# Patient Record
Sex: FEMALE | Race: WHITE | NOT HISPANIC OR LATINO | Employment: FULL TIME | URBAN - NONMETROPOLITAN AREA
[De-identification: names, ages, dates, MRNs, and addresses within clinical notes are randomized per-mention and may not be internally consistent; named-entity substitution may affect disease eponyms.]

---

## 2021-03-05 ENCOUNTER — IMMUNIZATION (OUTPATIENT)
Dept: VACCINE CLINIC | Facility: HOSPITAL | Age: 31
End: 2021-03-05

## 2021-03-05 PROCEDURE — 0001A: CPT | Performed by: INTERNAL MEDICINE

## 2021-03-05 PROCEDURE — 91300 HC SARSCOV02 VAC 30MCG/0.3ML IM: CPT | Performed by: INTERNAL MEDICINE

## 2021-03-26 ENCOUNTER — IMMUNIZATION (OUTPATIENT)
Dept: VACCINE CLINIC | Facility: HOSPITAL | Age: 31
End: 2021-03-26

## 2021-03-26 PROCEDURE — 91300 HC SARSCOV02 VAC 30MCG/0.3ML IM: CPT | Performed by: INTERNAL MEDICINE

## 2021-03-26 PROCEDURE — 0002A: CPT | Performed by: INTERNAL MEDICINE

## 2021-05-18 ENCOUNTER — TRANSCRIBE ORDERS (OUTPATIENT)
Dept: PHYSICAL THERAPY | Facility: CLINIC | Age: 31
End: 2021-05-18

## 2021-05-18 DIAGNOSIS — S29.019D THORACIC MYOFASCIAL STRAIN, SUBSEQUENT ENCOUNTER: ICD-10-CM

## 2021-05-18 DIAGNOSIS — S39.012D STRAIN OF LUMBAR REGION, SUBSEQUENT ENCOUNTER: Primary | ICD-10-CM

## 2022-10-19 ENCOUNTER — TRANSCRIBE ORDERS (OUTPATIENT)
Dept: ONCOLOGY | Facility: HOSPITAL | Age: 32
End: 2022-10-19

## 2022-11-16 ENCOUNTER — INFUSION (OUTPATIENT)
Dept: ONCOLOGY | Facility: HOSPITAL | Age: 32
End: 2022-11-16

## 2022-11-16 VITALS
OXYGEN SATURATION: 96 % | WEIGHT: 169 LBS | RESPIRATION RATE: 18 BRPM | SYSTOLIC BLOOD PRESSURE: 122 MMHG | HEART RATE: 89 BPM | TEMPERATURE: 98 F | DIASTOLIC BLOOD PRESSURE: 79 MMHG

## 2022-11-16 DIAGNOSIS — D50.9 IRON DEFICIENCY ANEMIA, UNSPECIFIED IRON DEFICIENCY ANEMIA TYPE: Primary | ICD-10-CM

## 2022-11-16 PROCEDURE — 25010000002 IRON SUCROSE PER 1 MG: Performed by: NURSE PRACTITIONER

## 2022-11-16 PROCEDURE — 96366 THER/PROPH/DIAG IV INF ADDON: CPT

## 2022-11-16 PROCEDURE — 96365 THER/PROPH/DIAG IV INF INIT: CPT

## 2022-11-16 RX ADMIN — IRON SUCROSE 400 MG: 20 INJECTION, SOLUTION INTRAVENOUS at 09:04

## 2022-11-23 ENCOUNTER — INFUSION (OUTPATIENT)
Dept: ONCOLOGY | Facility: HOSPITAL | Age: 32
End: 2022-11-23

## 2022-11-23 VITALS
HEART RATE: 83 BPM | DIASTOLIC BLOOD PRESSURE: 70 MMHG | SYSTOLIC BLOOD PRESSURE: 118 MMHG | RESPIRATION RATE: 18 BRPM | TEMPERATURE: 97.8 F | OXYGEN SATURATION: 96 %

## 2022-11-23 DIAGNOSIS — D50.9 IRON DEFICIENCY ANEMIA, UNSPECIFIED IRON DEFICIENCY ANEMIA TYPE: Primary | ICD-10-CM

## 2022-11-23 PROCEDURE — 25010000002 IRON SUCROSE PER 1 MG: Performed by: NURSE PRACTITIONER

## 2022-11-23 PROCEDURE — 96366 THER/PROPH/DIAG IV INF ADDON: CPT

## 2022-11-23 PROCEDURE — 96365 THER/PROPH/DIAG IV INF INIT: CPT

## 2022-11-23 RX ADMIN — IRON SUCROSE 400 MG: 20 INJECTION, SOLUTION INTRAVENOUS at 08:54

## 2022-11-30 ENCOUNTER — INFUSION (OUTPATIENT)
Dept: ONCOLOGY | Facility: HOSPITAL | Age: 32
End: 2022-11-30

## 2022-11-30 VITALS
RESPIRATION RATE: 18 BRPM | TEMPERATURE: 98.7 F | WEIGHT: 173 LBS | SYSTOLIC BLOOD PRESSURE: 114 MMHG | DIASTOLIC BLOOD PRESSURE: 73 MMHG | HEART RATE: 80 BPM | OXYGEN SATURATION: 92 %

## 2022-11-30 DIAGNOSIS — D50.9 IRON DEFICIENCY ANEMIA, UNSPECIFIED IRON DEFICIENCY ANEMIA TYPE: Primary | ICD-10-CM

## 2022-11-30 PROCEDURE — 96367 TX/PROPH/DG ADDL SEQ IV INF: CPT

## 2022-11-30 PROCEDURE — 96366 THER/PROPH/DIAG IV INF ADDON: CPT

## 2022-11-30 PROCEDURE — 96365 THER/PROPH/DIAG IV INF INIT: CPT

## 2022-11-30 PROCEDURE — 25010000002 IRON SUCROSE PER 1 MG: Performed by: NURSE PRACTITIONER

## 2022-11-30 RX ADMIN — IRON SUCROSE 400 MG: 20 INJECTION, SOLUTION INTRAVENOUS at 09:06

## 2023-01-09 ENCOUNTER — TRANSCRIBE ORDERS (OUTPATIENT)
Dept: ADMINISTRATIVE | Facility: HOSPITAL | Age: 33
End: 2023-01-09
Payer: COMMERCIAL

## 2023-01-09 DIAGNOSIS — N39.0 URINARY TRACT INFECTION WITHOUT HEMATURIA, SITE UNSPECIFIED: Primary | ICD-10-CM

## 2023-01-13 ENCOUNTER — HOSPITAL ENCOUNTER (OUTPATIENT)
Dept: ULTRASOUND IMAGING | Facility: HOSPITAL | Age: 33
Discharge: HOME | End: 2023-01-13
Admitting: NURSE PRACTITIONER
Payer: COMMERCIAL

## 2023-01-13 DIAGNOSIS — N39.0 URINARY TRACT INFECTION WITHOUT HEMATURIA, SITE UNSPECIFIED: ICD-10-CM

## 2023-01-13 PROCEDURE — 76856 US EXAM PELVIC COMPLETE: CPT

## 2023-01-13 PROCEDURE — 76856 US EXAM PELVIC COMPLETE: CPT | Performed by: RADIOLOGY

## 2023-02-03 ENCOUNTER — HOSPITAL ENCOUNTER (OUTPATIENT)
Dept: GENERAL RADIOLOGY | Facility: HOSPITAL | Age: 33
Discharge: HOME | End: 2023-02-03
Admitting: NURSE PRACTITIONER
Payer: COMMERCIAL

## 2023-02-03 ENCOUNTER — OFFICE VISIT (OUTPATIENT)
Dept: UROLOGY | Facility: CLINIC | Age: 33
End: 2023-02-03
Payer: COMMERCIAL

## 2023-02-03 VITALS — BODY MASS INDEX: 31.89 KG/M2 | HEIGHT: 63 IN | WEIGHT: 180 LBS

## 2023-02-03 DIAGNOSIS — K59.04 CHRONIC IDIOPATHIC CONSTIPATION: ICD-10-CM

## 2023-02-03 DIAGNOSIS — G89.29 CHRONIC MIDLINE LOW BACK PAIN WITHOUT SCIATICA: ICD-10-CM

## 2023-02-03 DIAGNOSIS — R35.0 FREQUENCY OF MICTURITION: ICD-10-CM

## 2023-02-03 DIAGNOSIS — N30.00 ACUTE CYSTITIS WITHOUT HEMATURIA: Primary | ICD-10-CM

## 2023-02-03 DIAGNOSIS — R33.9 INCOMPLETE EMPTYING OF BLADDER: ICD-10-CM

## 2023-02-03 DIAGNOSIS — M54.50 CHRONIC MIDLINE LOW BACK PAIN WITHOUT SCIATICA: ICD-10-CM

## 2023-02-03 DIAGNOSIS — R10.30 LOWER ABDOMINAL PAIN: ICD-10-CM

## 2023-02-03 LAB
BILIRUB BLD-MCNC: NEGATIVE MG/DL
CLARITY, POC: CLEAR
COLOR UR: YELLOW
EXPIRATION DATE: NORMAL
GLUCOSE UR STRIP-MCNC: NEGATIVE MG/DL
KETONES UR QL: NEGATIVE
LEUKOCYTE EST, POC: NEGATIVE
Lab: NORMAL
NITRITE UR-MCNC: NEGATIVE MG/ML
PH UR: 6 [PH] (ref 5–8)
PROT UR STRIP-MCNC: NEGATIVE MG/DL
RBC # UR STRIP: NEGATIVE /UL
SP GR UR: 1 (ref 1–1.03)
UROBILINOGEN UR QL: NORMAL

## 2023-02-03 PROCEDURE — 87086 URINE CULTURE/COLONY COUNT: CPT | Performed by: NURSE PRACTITIONER

## 2023-02-03 PROCEDURE — 74018 RADEX ABDOMEN 1 VIEW: CPT | Performed by: RADIOLOGY

## 2023-02-03 PROCEDURE — 99204 OFFICE O/P NEW MOD 45 MIN: CPT | Performed by: NURSE PRACTITIONER

## 2023-02-03 PROCEDURE — 74018 RADEX ABDOMEN 1 VIEW: CPT

## 2023-02-03 RX ORDER — VIBEGRON 75 MG/1
1 TABLET, FILM COATED ORAL NIGHTLY
Qty: 30 TABLET | Refills: 3 | COMMUNITY
Start: 2023-02-03 | End: 2023-03-05

## 2023-02-03 RX ORDER — PANTOPRAZOLE SODIUM 40 MG/1
1 TABLET, DELAYED RELEASE ORAL DAILY
COMMUNITY
Start: 2022-12-31

## 2023-02-03 RX ORDER — CARIPRAZINE 4.5 MG/1
1 CAPSULE, GELATIN COATED ORAL DAILY
COMMUNITY
Start: 2023-01-10

## 2023-02-03 RX ORDER — POLYETHYLENE GLYCOL 3350 17 G/17G
17 POWDER, FOR SOLUTION ORAL DAILY
Qty: 72 EACH | Refills: 5 | Status: SHIPPED | OUTPATIENT
Start: 2023-02-03

## 2023-02-03 RX ORDER — PSEUDOEPHEDRINE HCL 30 MG/1
TABLET, FILM COATED ORAL
COMMUNITY
Start: 2022-12-27 | End: 2023-03-08

## 2023-02-03 RX ORDER — GUAIFENESIN, PSEUDOEPHEDRINE HYDROCHLORIDE 600; 60 MG/1; MG/1
TABLET, EXTENDED RELEASE ORAL
COMMUNITY
Start: 2023-02-01 | End: 2023-03-08

## 2023-02-03 NOTE — PROGRESS NOTES
Chief Complaint  Urinary Tract Infection (NEW PATIENT WITH ACUTE CYSTITIS/VU)    Subjective     {CC  Problem List  Visit Diagnosis   Encounters  Notes  Medications  Labs  Result Review Imaging  Media :23}     Qing Greer presents to [facility] for VU/UTI  History of Present Illness    Mrs. Qing Greer is a pleasant 32-year-old female who presents to clinic today for evaluation as a new patient consult. She has been referred to us by her PCP CHARISSA Jensen with concerns of recurrent UTIs, stress urinary incontinence. On initial evaluation in clinic today, SHE REPORTS NUMEROUS OTHER CONCERNS. Her urine dipstick is completely negative for any infection, it is negative for gross/microscopic hematuria. Her PVR is 0 mL.     She states she has had at least 4 urinary tract infections in the last 3 months. The patient's symptoms include frequent urination, bladder incontinence, and occasional burning when she urinates. She confirms mild back pain, but notes that has been present since having her children.      The patient notes that she had to receive a blood patch after her daughter was born, from the spinal tap,  due to the severe migraines she was experiencing. She denies any suprapubic discomfort or any hematuria. The patient reports that she is unsure if the prior urinary tract infections were positive for any bacteria growth.      She does not feel that her bladder completely empties each time she urinates and notes that she has the urge to urinate very frequently. The patient was told that she did not have an urinary tract infection at the [facility], but was informed she was positive after seeing her primary care provider.     She was informed that she may have interstitial cystitis and why she was sent here for further evaluation. The patient denies being prescribed any samples of medication to help with over active bladder symptoms.  "She states she has not taken any antibiotics for a UTI in 2.5 months.     The patient confirms that she still has all her female reproductive organs. She states G2, P2. The patient denies any bladder dropping or prolapse as she states \"she is not sure what that would even fill like.\"      The patient states that she does enjoy drinking coffee, but majority of her fluid intake is water. She is not sure of the exact amount of water she drinks in a day; however, states that she \"usually always has a bottle of water in her hand.\" The patient notes that she does get up frequently during the day and at night to urinate. She reports that she really tries to not drink much near bed time, only drinks water for when she is taking her medication. The patient states that she feels like her bladder is controlling her as she mentions that she will urinate and then have the urge to go again, but nothing will come out after approximately 5 minutes of sitting on the toilet. She confirms occasional constipation and diarrhea.     She confirms to having a Pap smear in the past and nothing has come back positive. The patient has previously taken Bactrim for antibiotics. She denies any recent x-rays or scans. She states there is no history of renal calculi and is unaware if any are present currently. The patient denies any family history of bladder cancer, cervical, uterine, or breast cancer.     She currently smokes via vaping.      The patient is allergic to MACROBID.      We both reviewed/discussed her KUB results today which are unremarkable with no dilation. GASTROINTESTINAL TRACT: Abundant Colonic stool.  This is characterized by extreme amounts of constipation.  We spoke about the impact of this on bladder function.  We spoke about  its relationship to recurrent urinary tract infections, vaginal pain, pelvic/bladder pressure, back pain, flank pain she is experiencing.        Objective   Vital Signs:   Ht 160 cm (63\")   Wt 81.6 kg " (180 lb)   BMI 31.89 kg/m²       ROS:   Review of Systems   Constitutional: Negative.    HENT: Negative.    Eyes: Negative.    Respiratory: Negative.    Cardiovascular: Negative.    Gastrointestinal: Negative.    Endocrine: Negative.    Genitourinary: Positive for frequency, urgency and urinary incontinence. Negative for amenorrhea, breast discharge, breast lump, breast pain, decreased libido, decreased urine volume, difficulty urinating, dyspareunia, dysuria, flank pain, genital sores, hematuria, menstrual problem, pelvic pain, pelvic pressure, vaginal bleeding, vaginal discharge and vaginal pain.   Musculoskeletal: Negative.    Allergic/Immunologic: Negative.    Neurological: Negative.    Hematological: Negative.    Psychiatric/Behavioral: Negative.         Physical Exam   Result Review :{ Labs  Result Review  Imaging  Med Tab  Media :23}   {The following data was reviewed by (Optional):27829}  {Ambulatory Labs (Optional):32704}  {Data reviewed (Optional):23911:::1}              Assessment and Plan {CC Problem List  Visit Diagnosis  ROS  Review (Popup)  Health Maintenance  Quality  BestPractice  Medications  SmartSets  SnapShot Encounters  Media :23}   Problem List Items Addressed This Visit    None  Visit Diagnoses     Acute cystitis without hematuria    -  Primary    Relevant Medications    Vibegron (Gemtesa) 75 MG tablet    Other Relevant Orders    XR Abdomen KUB (Completed)    Incomplete emptying of bladder        Relevant Medications    Vibegron (Gemtesa) 75 MG tablet    Other Relevant Orders    Bladder Scan (Completed)    Frequency of micturition        A urodynamics study has been ordered.     Relevant Medications    Vibegron (Gemtesa) 75 MG tablet    Other Relevant Orders    Bladder Scan (Completed)    POC Urinalysis Dipstick, Automated (Completed)    Urine Culture - Urine, Urine, Random Void    Chronic midline low back pain without sciatica        Relevant Medications    polyethylene  glycol (MiraLax) 17 g packet    Other Relevant Orders    XR Abdomen KUB (Completed)    Lower abdominal pain        Relevant Medications    polyethylene glycol (MiraLax) 17 g packet    Other Relevant Orders    XR Abdomen KUB (Completed)    Chronic idiopathic constipation        Relevant Medications    polyethylene glycol (MiraLax) 17 g packet        She is getting an x-ray today. Samples of Gemtesa are sent home. I am going to reevaluate her KUB to make sure it is not severe constipation. We are going to schedule her for her for urodynamics. If the Gemtesa works great, we will cancel the urodynamics.       ASSESSMENT  Overactive Bladder/Urinary Incontinence:  Very pleasant patient evaluated in clinic today with mixed urinary incontinence symptoms that have been ongoing since her childhood, and now gradually becoming very bothersome to her.  She has failed multiple therapy in the last 5 years, none recently she was started on on oxybutynin 5 mg twice daily by her PCP, with minimal benefits.  She does not have recurrent UTIs, and denies any episodes of dysuria, burning on urination, or gross hematuria.  Her urine dipstick today is  completely negative for any infection, it is negative for gross/microscopic hematuria.  PVR is 48 CC. We discussed treatable and non-treatable causes of both stress and urge urinary incontinence.     With regards to stress urinary incontinence we discussed its relationship to childbirth and pelvic health.  We discussed the grading of stress incontinence with trying to quantitate the number of pads used.  We talked about leaking urine with laughing, lifting, coughing, and sexual intercourse.      Talked about the Urge component and the concept of mixed incontinence where upon the stress is treatable, but the urge may exist and that 50% of the time the urge will resolve with treatment of the stress incontinence.  We talked with the diagnostic workup including a postvoid residual urine, OR even  a simple cystometrogram.  I discussed the findings that may be neurologically related including commonly seen with multiple sclerosis, Parkinson's disease, and stroke.      I talked about the various therapeutic options including anticholinergics, beta 3 agonists, and alpha blockade if there is a component of obstruction.  I discussed the side effects of anti-cholinergic including dry mouth, double vision.      ASSESSMENT  Recurrent urinary tract infections/OverActiveBladder/Atrophic/yeast vaginitis: She is in no apparent distress and reports a remarkable improvement in her overall symptoms. She is happy to be feeling better she states.    She reports doing relatively better on her antibiotic prophylaxis, and would like to continue. Again,  We discussed the types of organisms that are found in the urinary tract indicating that the vast majority are results of the patient's own gastrointestinal alexandra.  We discussed how many of the antibiotics that are utilized can actually exacerbate these infections by creating resistant organisms and there is only a very few antibiotics that are concentrated in the urine and do not affect the rectal reservoir nor cause recurrent yeast vaginitis.      We discussed the risk factors for recurrent infections being intercourse in younger patients and atrophic changes in older patients.  We discussed the symptoms that are found including pain, pressure, burning, frequency, urgency suprapubic pain and painful intercourse.  I discussed upper tract symptoms including fevers, chills, and indicated the workup would be much more aggressive if the patient were to present with recurrent infections in the face of upper tract symptomatology such as fever. I discussed the history of vesicoureteral reflux in young patients and finally chronic renal scarring as a result of such.         PLAN  We resent her urine for culture. I will call her with results if any bacteria growth not sensitive to her  current therapy of Macrobid..    Will Continue Macrobid 100 mg PO Daily -Suppressive Therapy. She should increase her p.o. fluid intake to at least 1 to 2 L daily and avoid bladder irritants such as caffeine products, spicy foods, and citrusy foods.     I recommend concomitant probiotics with treatment with antibiotics to protect the rectal reservoir including over-the-counter yogurt preparations to deonna oral pills containing the appropriate probiotics. Patient reports the diligent use of Probiotics.    She is to also continue other medications for yeast vaginitis, atrophic vaginitis as prescribed above.    Will see her back in 6 months for Follow up in clinic and recheck her urinalysis at that time.    Patient is agreeable to plan of care.    .      PLAN    We sent her urine for culture, due to concerns of frequency, urgency, pelvic pain and pressure.  I will call her with results if any positive bacterial growth.    Start GEMTESSA 75MG daily.-Samples given to try X 1 month    Discussed upper tract investigation, patient had a CT scan abdomen/pelvis September 2020 which was unremarkable, besides a periumbilical hernia slightly to the right of the midline measuring approximately 5 cm in diameter.    Discussed lower tract investigation, Patient wants to wait.-Try Myrbetriq first.    Discussed things she can do to help such as her weight control, keeping a bladder diary with strict intake and output of what she eats or drinks, how often she urinates, how much she urinates.      She should follow a timed voiding bladder training program, such as limiting the amount of time between bathroom breaks, using the bathroom at regular intervals such as every 2-3 hours.  And using techniques to suppress bladder urges.      Also discussed pelvic floor muscle exercises, and do Keagle exercises to strengthen the muscles to help control urination.    We will follow-up in 4 weeks, Evaluate medication effectiveness.    Patient is  agreeable plan of care.    Patient reports that she is not currently experiencing any symptoms of urinary incontinence.      BMI is >= 30 and <35. (Class 1 Obesity). The following options were offered after discussion;: weight loss educational material (shared in after visit summary), exercise counseling/recommendations and nutrition counseling/recommendations      RADIOLOGY (CT AND/OR KUB):    CT Abdomen and Pelvis: No results found for this or any previous visit.     CT Stone Protocol: No results found for this or any previous visit.     KUB: No results found for this or any previous visit.       LABS (3 MONTHS):    Office Visit on 02/03/2023   Component Date Value Ref Range Status   • Color 02/03/2023 Yellow  Yellow, Straw, Dark Yellow, Guadalupe Final   • Clarity, UA 02/03/2023 Clear  Clear Final   • Specific Gravity  02/03/2023 1.005  1.005 - 1.030 Final   • pH, Urine 02/03/2023 6.0  5.0 - 8.0 Final   • Leukocytes 02/03/2023 Negative  Negative Final   • Nitrite, UA 02/03/2023 Negative  Negative Final   • Protein, POC 02/03/2023 Negative  Negative mg/dL Final   • Glucose, UA 02/03/2023 Negative  Negative mg/dL Final   • Ketones, UA 02/03/2023 Negative  Negative Final   • Urobilinogen, UA 02/03/2023 Normal  Normal, 0.2 E.U./dL Final   • Bilirubin 02/03/2023 Negative  Negative Final   • Blood, UA 02/03/2023 Negative  Negative Final   • Lot Number 02/03/2023 [id number]   Final   • Expiration Date 02/03/2023 2/8/24   Final        Smoking Cessation Counseling:  Current every day smoker. less than 3 minutes spent counseling. Will try to cut down.  I advised patient to quit tobacco use and offered support.  I provided patient with tobacco cessation educational material printed in the patient's After Visit Summary.       Follow Up {Instructions Charge Capture  Follow-up Communications :23}  No follow-ups on file.    Patient was given instructions and counseling regarding her condition or for health maintenance  "advice. Please see specific information pulled into the AVS if appropriate.          This document has been electronically signed by Griselda Cheng-Akwa, APRN   February 3, 2023 14:23 EST      Transcribed from ambient dictation for Griselda Cheng-Akwa, APRN by Miriam Ngo.  02/03/23   17:01 EST    {VICKY Provider Statement:20444::\"Patient or patient representative verbalized consent to the visit recording.\",\"I have personally performed the services described in this document as transcribed by the above individual, and it is both accurate and complete.\"}        Dictated Utilizing Dragon Dictation: Part of this note may be an electronic transcription/translation of spoken language to printed text using the Dragon Dictation System.       "

## 2023-02-04 LAB — BACTERIA SPEC AEROBE CULT: NO GROWTH

## 2023-02-05 NOTE — PROGRESS NOTES
Chief Complaint  Urinary Tract Infection (NEW PATIENT WITH ACUTE CYSTITIS/VU/DETRUSOR INSTABILITY/IBS-C)    Mars Greer presents to [facility] for uUTI/OAB/CYSTITIS/IBS-C  History of Present Illness  Mrs. Qing Greer is a pleasant 32-year-old female who presents to clinic today for evaluation as a new patient consult. She has been referred to us by her PCP CHARISSA Jensen with concerns of recurrent UTIs, stress urinary incontinence. On initial evaluation in clinic today, SHE REPORTS NUMEROUS OTHER CONCERNS. Her urine dipstick is completely negative for any infection, it is negative for gross/microscopic hematuria. Her PVR is 0 mL.     She states she has had at least 4 urinary tract infections in the last 3 months. The patient's symptoms include frequent urination, bladder incontinence, and occasional burning when she urinates. She confirms mild back pain, but notes that has been present since having her children.      The patient notes that she had to receive a blood patch after her daughter was born, from the spinal tap,  due to the severe migraines she was experiencing. She denies any suprapubic discomfort or any hematuria. The patient reports that she is unsure if the prior urinary tract infections were positive for any bacteria growth.      She does not feel that her bladder completely empties each time she urinates and notes that she has the urge to urinate very frequently. The patient was told that she did not have an urinary tract infection at the [facility], but was informed she was positive after seeing her primary care provider.     She was informed that she may have interstitial cystitis and why she was sent here for further evaluation. The patient denies being prescribed any samples of medication to help with over active bladder symptoms. She states she has not taken any antibiotics for a UTI in 2.5 months.     The  "patient confirms that she still has all her female reproductive organs. She states G2, P2. The patient denies any bladder dropping or prolapse as she states \"she is not sure what that would even fill like.\"      The patient states that she does enjoy drinking coffee, but majority of her fluid intake is water. She is not sure of the exact amount of water she drinks in a day; however, states that she \"usually always has a bottle of water in her hand.\" The patient notes that she does get up frequently during the day and at night to urinate. She reports that she really tries to not drink much near bed time, only drinks water for when she is taking her medication. The patient states that she feels like her bladder is controlling her as she mentions that she will urinate and then have the urge to go again, but nothing will come out after approximately 5 minutes of sitting on the toilet. She confirms occasional constipation and diarrhea.     She confirms to having a Pap smear in the past and nothing has come back positive. The patient has previously taken Bactrim for antibiotics. She denies any recent x-rays or scans. She states there is no history of renal calculi and is unaware if any are present currently. The patient denies any family history of bladder cancer, cervical, uterine, or breast cancer.     She currently smokes via vaping.      The patient is allergic to MACROBID.      We both reviewed/discussed her KUB results today which are unremarkable with no dilation. GASTROINTESTINAL TRACT: Abundant Colonic stool.  This is characterized by extreme amounts of constipation.  We spoke about the impact of this on bladder function.  We spoke about  its relationship to recurrent urinary tract infections, vaginal pain, pelvic/bladder pressure, back pain, flank pain she is experiencing.         Objective   Vital Signs:   Ht 160 cm (63\")   Wt 81.6 kg (180 lb)   BMI 31.89 kg/m²       ROS:   Review of Systems   Constitutional: " Positive for activity change, appetite change, fatigue and unexpected weight gain. Negative for chills, diaphoresis, fever and unexpected weight loss.   HENT: Negative for congestion, ear discharge, ear pain, nosebleeds, rhinorrhea, sinus pressure and sore throat.    Eyes: Negative for blurred vision, double vision, photophobia, pain, redness and visual disturbance.   Respiratory: Negative for apnea, cough, chest tightness, shortness of breath, wheezing and stridor.    Cardiovascular: Negative for chest pain and palpitations.   Gastrointestinal: Positive for abdominal distention, abdominal pain, constipation, nausea and GERD. Negative for diarrhea and vomiting.   Endocrine: Negative for polydipsia, polyphagia and polyuria.   Genitourinary: Positive for flank pain, frequency, pelvic pain and pelvic pressure. Negative for decreased urine volume, difficulty urinating, dyspareunia, dysuria, genital sores, hematuria, urgency, urinary incontinence and vaginal discharge.   Musculoskeletal: Positive for back pain and myalgias. Negative for arthralgias and joint swelling.   Skin: Positive for dry skin and pallor. Negative for rash and wound.   Neurological: Positive for weakness. Negative for dizziness, tremors, syncope, light-headedness, headache, memory problem and confusion.   Psychiatric/Behavioral: Positive for sleep disturbance and stress. Negative for behavioral problems and decreased concentration.        Physical Exam  Constitutional:       General: She is in acute distress.      Appearance: She is well-developed. She is obese.   HENT:      Head: Normocephalic and atraumatic.   Eyes:      Pupils: Pupils are equal, round, and reactive to light.   Neck:      Thyroid: No thyromegaly.      Trachea: No tracheal deviation.   Cardiovascular:      Rate and Rhythm: Normal rate and regular rhythm.      Heart sounds: No murmur heard.  Pulmonary:      Effort: Pulmonary effort is normal. No respiratory distress.      Breath  sounds: Normal breath sounds. No stridor. No wheezing.   Abdominal:      General: Bowel sounds are normal. There is distension.      Palpations: Abdomen is soft.      Tenderness: There is abdominal tenderness.   Genitourinary:     Labia:         Right: No tenderness.         Left: No tenderness.       Vagina: Normal. No vaginal discharge.      Comments: URINE FREQUENCY, URGENCY/OAB  Musculoskeletal:         General: Tenderness present. No deformity. Normal range of motion.      Cervical back: Normal range of motion.   Skin:     General: Skin is warm and dry.      Capillary Refill: Capillary refill takes less than 2 seconds.      Coloration: Skin is pale.      Findings: No erythema or rash.   Neurological:      Mental Status: She is alert and oriented to person, place, and time.      Cranial Nerves: No cranial nerve deficit.      Sensory: No sensory deficit.      Motor: Weakness present.      Coordination: Coordination normal.   Psychiatric:         Behavior: Behavior normal.         Thought Content: Thought content normal.         Judgment: Judgment normal.        Result Review :     UA    Urinalysis 2/3/23   Ketones, UA Negative   Leukocytes, UA Negative           Urine Culture    Urine Culture 2/3/23   Urine Culture No growth                Assessment and Plan    Problem List Items Addressed This Visit    None  Visit Diagnoses     Acute cystitis without hematuria    -  Primary    Relevant Medications    Vibegron (Gemtesa) 75 MG tablet    Other Relevant Orders    XR Abdomen KUB (Completed)    CT Abdomen Pelvis Stone Protocol    Incomplete emptying of bladder        Relevant Medications    Vibegron (Gemtesa) 75 MG tablet    Other Relevant Orders    Bladder Scan (Completed)    Frequency of micturition        A urodynamics study has been ordered.     Relevant Medications    Vibegron (Gemtesa) 75 MG tablet    Other Relevant Orders    Bladder Scan (Completed)    POC Urinalysis Dipstick, Automated (Completed)    Urine  Culture - Urine, Urine, Random Void (Completed)    Chronic midline low back pain without sciatica        Relevant Medications    polyethylene glycol (MiraLax) 17 g packet    Other Relevant Orders    XR Abdomen KUB (Completed)    CT Abdomen Pelvis Stone Protocol    Lower abdominal pain        Relevant Medications    polyethylene glycol (MiraLax) 17 g packet    Other Relevant Orders    XR Abdomen KUB (Completed)    CT Abdomen Pelvis Stone Protocol    Chronic idiopathic constipation        Relevant Medications    polyethylene glycol (MiraLax) 17 g packet                                                              ASSESSMENT  RECURRENT UTIS /Overactive Bladder/Urinary Incontinence/IBS-C:  Mrs. Qing Greer is a pleasant 32-year-old female who presents to clinic today for evaluation as a new patient consult. She has been referred to us by her PCP CHARISSA Jensen with concerns of recurrent UTIs, stress urinary incontinence. On initial evaluation in clinic today, SHE REPORTS NUMEROUS OTHER CONCERNS. Her urine dipstick is completely negative for any infection, it is negative for gross/microscopic hematuria. Her PVR is 0 mL      OAB/URINE INCONTINENCE:Very pleasant patient evaluated in clinic today with mixed urinary incontinence symptoms that have been ongoing  and now gradually becoming very bothersome to her. She does have recurrent UTIs, and REPORTS MANY  episodes of dysuria, burning on urination,BUT DENIES gross hematuria. We discussed treatable and non-treatable causes of both stress and urge urinary incontinence.      With regards to stress urinary incontinence we discussed its relationship to childbirth and pelvic health.  We discussed the grading of stress incontinence with trying to quantitate the number of pads used.  We talked about leaking urine with laughing, lifting, coughing, and sexual intercourse.       Talked about the Urge component and the concept of mixed incontinence where upon the stress  is treatable, but the urge may exist and that 50% of the time the urge will resolve with treatment of the stress incontinence.  We talked with the diagnostic workup including a postvoid residual urine, OR even a simple cystometrogram.  I discussed the findings that may be neurologically related including commonly seen with multiple sclerosis, Parkinson's disease, and stroke.  I talked about the various therapeutic options including anticholinergics, beta 3 agonists, and alpha blockade if there is a component of obstruction.  I discussed the side effects of anti-cholinergic including dry mouth, double vision    Recurrent urinary tract infections: We discussed the types of organisms that are found in the urinary tract indicating that the vast majority are results of the patient's own gastrointestinal alexandra.  We discussed how many of the antibiotics that are utilized can actually exacerbate these infections by creating resistant organisms and there is only a very few antibiotics that are concentrated in the urine and do not affect the rectal reservoir nor cause recurrent yeast vaginitis.       We discussed the risk factors for recurrent infections being intercourse in younger patients and atrophic changes in older patients.  We discussed the symptoms that are found including pain, pressure, burning, frequency, urgency suprapubic pain and painful intercourse.  I discussed upper tract symptoms including fevers, chills, and indicated the workup would be much more aggressive if the patient were to present with recurrent infections in the face of upper tract symptomatology such as fever. I discussed the history of vesicoureteral reflux in young patients and finally chronic renal scarring as a result of such.     IBS- Patient has significant irritable bowel syndrome, characterized by extreme amounts of constipation.  We spoke about the impact of this on bladder function.  We spoke about  its relationship to recurrent urinary  tract infections.  We discussed the need for increasing p.o. fluid intake to at least 2 to 3 L of water daily, and discussed the physiology of colonic motility as well as use of MiraLAX as a bulk laxative versus the newer class of serotonin uptake blockers such as Linzess.  We stressed the need for a daily bowel movement and discussed the [place] stool scale at length. We also discussed a referral to the GI nurse practitioner.                                                  PLAN  We resent her urine for culture. I will call her with results if any bacteria growth      Will START  Macrobid 100 mg PO Daily -Suppressive Therapy IF positive     She HAS BEEN ENCOURAGED TO increase her p.o. fluid intake to at least 1 to 2 L daily and avoid bladder irritants such as caffeine products, spicy foods, and citrusy foods.      I recommend concomitant probiotics with treatment with antibiotics to protect the rectal reservoir including over-the-counter yogurt preparations to deonna oral pills containing the appropriate probiotics. Patient reports the diligent use of Probiotics.     She is to also continue other medications for yeast vaginitis, atrophic vaginitis as prescribed above.    START GEMTESSA 75 MG DAILY FOR DETRUSSOR INSTABILITY-SAMPLES GIVEN X 1 MONTH TRIAL    ALSO DAILY MIRALAX FOR IBS-C, DISCUSSED GI REFERRAL     Will see her back in 1 months for Follow up in clinic and recheck her urinalysis at that time.ALSO EVALUATE MEDS-GEMTESSA    DISCUSS URODYNAMICS IF SYMPTOMS PERSIST-SCHEDULED 03/15/23     Discussed upper tract investigation VIA CT STONE PROTOCOL-SCHEDULED     Discussed lower tract investigation, Patient wants to wait.-Try GEMTESSA first.     Discussed things she can do to help such as her weight control, keeping a bladder diary with strict intake and output of what she eats or drinks, how often she urinates, how much she urinates.       She should follow a timed voiding bladder training program, such as  limiting the amount of time between bathroom breaks, using the bathroom at regular intervals such as every 2-3 hours.  And using techniques to suppress bladder urges.       Also discussed pelvic floor muscle exercises, and do Keagle exercises to strengthen the muscles to help control urination.     We will follow-up in 4 weeks, Evaluate medication effectiveness.     Patient is agreeable plan of care.    Patient reports that she is not currently experiencing any symptoms of urinary incontinence.    BMI is >= 30 and <35. (Class 1 Obesity). The following options were offered after discussion;: weight loss educational material (shared in after visit summary), exercise counseling/recommendations and nutrition counseling/recommendations      RADIOLOGY (CT AND/OR KUB):    CT Abdomen and Pelvis: No results found for this or any previous visit.     CT Stone Protocol: No results found for this or any previous visit.     KUB: Results for orders placed in visit on 02/03/23    XR Abdomen KUB    Narrative  EXAMINATION: XR ABDOMEN KUB-    CLINICAL INDICATION: back pain/ abdominal pain; N30.00-Acute cystitis  without hematuria; M54.50-Low back pain, unspecified; G89.29-Other  chronic pain; R10.30-Lower abdominal pain, unspecified    COMPARISON: None available    FINDINGS: 2 views of the abdomen    Moderate to large stool burden    Surgical clips in the right upper quadrant    No suspicious calcifications are seen    Tiny calcification is seen in the left hemipelvis of indeterminate  etiology.    This report was finalized on 2/3/2023 2:37 PM by Dr. Lopez Rueda MD.       LABS (3 MONTHS):    Office Visit on 02/03/2023   Component Date Value Ref Range Status   • Color 02/03/2023 Yellow  Yellow, Straw, Dark Yellow, Gu[place]upe Final   • Clarity, UA 02/03/2023 Clear  Clear Final   • Specific Gravity  02/03/2023 1.005  1.005 - 1.030 Final   • pH, Urine 02/03/2023 6.0  5.0 - 8.0 Final   • Leukocytes 02/03/2023 Negative  Negative Final   •  Nitrite, UA 02/03/2023 Negative  Negative Final   • Protein, POC 02/03/2023 Negative  Negative mg/dL Final   • Glucose, UA 02/03/2023 Negative  Negative mg/dL Final   • Ketones, UA 02/03/2023 Negative  Negative Final   • Urobilinogen, UA 02/03/2023 Normal  Normal, 0.2 E.U./dL Final   • Bilirubin 02/03/2023 Negative  Negative Final   • Blood, UA 02/03/2023 Negative  Negative Final   • Lot Number 02/03/2023 [id number]   Final   • Expiration Date 02/03/2023 2/8/24   Final   • Urine Culture 02/03/2023 No growth   Final        Smoking Cessation Counseling:  Current every day smoker. less than 3 minutes spent counseling. Will try to cut down.  I advised patient to quit tobacco use and offered support.  I provided patient with tobacco cessation educational material printed in the patient's After Visit Summary.       Follow Up   Return in about 1 month (around 3/3/2023) for Next scheduled follow up, RECURRENT UTI/DYSURIA/DETRUSSOR INSTABILITY-EVAL GEMTESSA.    Patient was given instructions and counseling regarding her condition or for health maintenance advice. Please see specific information pulled into the AVS if appropriate.          This document has been electronically signed by Griselda Cheng-Akwa, APRN   February 5, 2023 04:53 EST      Dictated Utilizing Dragon Dictation: Part of this note may be an electronic transcription/translation of spoken language to printed text using the Dragon Dictation System.

## 2023-02-06 ENCOUNTER — TELEPHONE (OUTPATIENT)
Dept: UROLOGY | Facility: CLINIC | Age: 33
End: 2023-02-06
Payer: COMMERCIAL

## 2023-02-06 NOTE — TELEPHONE ENCOUNTER
I called and spoke to the pt    ----- Message from Griselda Cheng-Akwa, APRN sent at 2/5/2023  4:27 AM EST -----  PLEASE LET PATIENT KNOW HER URINE CULTURE IS NEGATIVE AT THIS TIME. CONTINUE HER THERAPY WITH GEMTESSA AND FOLLOW UP AS DISCUSSED. RA

## 2023-02-07 DIAGNOSIS — R33.9 INCOMPLETE EMPTYING OF BLADDER: Primary | ICD-10-CM

## 2023-03-08 ENCOUNTER — OFFICE VISIT (OUTPATIENT)
Dept: UROLOGY | Facility: CLINIC | Age: 33
End: 2023-03-08
Payer: COMMERCIAL

## 2023-03-08 VITALS — WEIGHT: 182 LBS | BODY MASS INDEX: 32.25 KG/M2 | HEIGHT: 63 IN

## 2023-03-08 DIAGNOSIS — N32.81 DETRUSOR INSTABILITY OF BLADDER: ICD-10-CM

## 2023-03-08 DIAGNOSIS — R35.0 FREQUENCY OF MICTURITION: ICD-10-CM

## 2023-03-08 DIAGNOSIS — N30.00 ACUTE CYSTITIS WITHOUT HEMATURIA: Primary | ICD-10-CM

## 2023-03-08 DIAGNOSIS — N39.3 SUI (STRESS URINARY INCONTINENCE, FEMALE): ICD-10-CM

## 2023-03-08 LAB
BILIRUB BLD-MCNC: NEGATIVE MG/DL
CLARITY, POC: CLEAR
COLOR UR: YELLOW
EXPIRATION DATE: NORMAL
GLUCOSE UR STRIP-MCNC: NEGATIVE MG/DL
KETONES UR QL: NEGATIVE
LEUKOCYTE EST, POC: NEGATIVE
Lab: NORMAL
NITRITE UR-MCNC: NEGATIVE MG/ML
PH UR: 6.5 [PH] (ref 5–8)
PROT UR STRIP-MCNC: NEGATIVE MG/DL
RBC # UR STRIP: NEGATIVE /UL
SP GR UR: 1.01 (ref 1–1.03)
UROBILINOGEN UR QL: NORMAL

## 2023-03-08 PROCEDURE — 51798 US URINE CAPACITY MEASURE: CPT | Performed by: NURSE PRACTITIONER

## 2023-03-08 PROCEDURE — 1160F RVW MEDS BY RX/DR IN RCRD: CPT | Performed by: NURSE PRACTITIONER

## 2023-03-08 PROCEDURE — 87086 URINE CULTURE/COLONY COUNT: CPT | Performed by: NURSE PRACTITIONER

## 2023-03-08 PROCEDURE — 99214 OFFICE O/P EST MOD 30 MIN: CPT | Performed by: NURSE PRACTITIONER

## 2023-03-08 PROCEDURE — 1159F MED LIST DOCD IN RCRD: CPT | Performed by: NURSE PRACTITIONER

## 2023-03-08 RX ORDER — DIVALPROEX SODIUM 500 MG/1
TABLET, EXTENDED RELEASE ORAL
COMMUNITY
Start: 2023-02-10

## 2023-03-08 RX ORDER — PRAZOSIN HYDROCHLORIDE 1 MG/1
CAPSULE ORAL
COMMUNITY
Start: 2023-02-10

## 2023-03-08 NOTE — PROGRESS NOTES
"Chief Complaint  Cystitis (1 MONTH FU Urinary Tract Infection /ACUTE CYSTITIS/VU/DETRUSOR INSTABILITY/IBS-C)    Subjective          Qing Pinto presents to [facility] for VU/D//ACUTE CYSTITIS  History of Present Illness    Mrs. QING PINTO is a pleasant 32-year-old female who returns to clinic today for evaluation. This is a 1 month follow-up with prior concerns of recurrent UTIs, acute cystitis without hematuria, detrusor instability complicated by urine frequency, urgency and incontinence episodes.     When patient was initially evaluated in clinic on 02/03/2023, she was in apparent discomfort. Nevertheless that urine dipstick post her clinic evaluation and culture were all negative for infection.  The plan was to start patient on antibiotic suppressive therapy with trimethoprim 100 mg nightly which was deferred due to negative culture. Due to her significant detrusor instability, she was also started on anticholinergic with samples of Gemtesa 75 mg given for patient to try.     She returns to clinic today for Reevaluation.  She is in no apparent discomfort however, she reports she has not noticed any change in her prior symptoms since starting Gemtesa 75 mg x 1 month and reports she is still very miserable.  However when asked about urinary incontinent episodes patient reports she has 1-2 every week.  Her urine dipstick today is completely negative for any infection, it is negative for gross/microscopic hematuria. Her PVR is 0 mL.    The patient states that she is doing well. She states that her bladder is about the same. She has not noticed a difference with the Gemtesa. She states that she is allergic to Macrobid. She is still experiencing urinary incontinence 2 times a week. She wears pads and diapers. She denies any episodes of hematuria since her last visit. She denies any worsening symptoms. She relays that she has been \"cleaning out\" her bowels. She states " "that she is taking Gemtesa every morning. She states that she has a CT scheduled for 03/16/2023.     We will proceed with urodynamics study for incontinent episodes, and overactive bladder symptoms.      Pending CT scan Results at this time.    Objective   Vital Signs:   Ht 160 cm (63\")   Wt 82.6 kg (182 lb)   BMI 32.24 kg/m²       ROS:   Review of Systems   Constitutional: Positive for activity change and fatigue. Negative for appetite change, chills, diaphoresis, fever, unexpected weight gain and unexpected weight loss.   HENT: Negative for congestion, ear discharge, ear pain, nosebleeds, rhinorrhea, sinus pressure and sore throat.    Eyes: Negative for blurred vision, double vision, photophobia, pain, redness and visual disturbance.   Respiratory: Negative for apnea, cough, chest tightness, shortness of breath, wheezing and stridor.    Cardiovascular: Negative for chest pain and palpitations.   Gastrointestinal: Positive for abdominal distention, constipation and nausea. Negative for abdominal pain, diarrhea and vomiting.   Endocrine: Negative for polydipsia, polyphagia and polyuria.   Genitourinary: Positive for dysuria, flank pain, frequency, pelvic pain, pelvic pressure, urgency and urinary incontinence. Negative for decreased urine volume, difficulty urinating, dyspareunia, genital sores, hematuria and vaginal discharge.   Musculoskeletal: Positive for back pain and myalgias. Negative for arthralgias and joint swelling.   Skin: Negative for pallor, rash and wound.   Neurological: Positive for weakness. Negative for dizziness, tremors, syncope, light-headedness, headache, memory problem and confusion.   Hematological: Does not bruise/bleed easily.   Psychiatric/Behavioral: Positive for sleep disturbance and stress. Negative for behavioral problems and decreased concentration. The patient is nervous/anxious.         Physical Exam  Constitutional:       General: She is in acute distress.      Appearance: She " is well-developed. She is obese.   HENT:      Head: Normocephalic and atraumatic.   Eyes:      Pupils: Pupils are equal, round, and reactive to light.   Neck:      Thyroid: No thyromegaly.      Trachea: No tracheal deviation.   Cardiovascular:      Rate and Rhythm: Normal rate and regular rhythm.      Heart sounds: No murmur heard.  Pulmonary:      Effort: Pulmonary effort is normal. No respiratory distress.      Breath sounds: Normal breath sounds. No stridor. No wheezing.   Abdominal:      General: Bowel sounds are normal. There is distension.      Palpations: Abdomen is soft.      Tenderness: There is abdominal tenderness.   Genitourinary:     Labia:         Right: No tenderness.         Left: No tenderness.       Vagina: Normal. No vaginal discharge.      Comments: URINE INCONTINENCE/FREQUENCY/urgency  Musculoskeletal:         General: Tenderness present. No deformity. Normal range of motion.      Cervical back: Normal range of motion.   Skin:     General: Skin is warm and dry.      Capillary Refill: Capillary refill takes less than 2 seconds.      Coloration: Skin is pale.      Findings: No erythema or rash.   Neurological:      Mental Status: She is alert and oriented to person, place, and time.      Cranial Nerves: No cranial nerve deficit.      Sensory: No sensory deficit.      Coordination: Coordination normal.   Psychiatric:         Behavior: Behavior normal.         Thought Content: Thought content normal.         Judgment: Judgment normal.        Result Review :     UA    Urinalysis 2/3/23 3/8/23   Ketones, UA Negative Negative   Leukocytes, UA Negative Negative           Urine Culture    Urine Culture 2/3/23 3/8/23   Urine Culture No growth Culture in progress (P)         History of neutropenia       Assessment and Plan    Problem List Items Addressed This Visit        Genitourinary and Reproductive     Acute cystitis without hematuria - Primary    Frequency of micturition    Relevant Orders    POC  Urinalysis Dipstick, Automated (Completed)    Urine Culture - Urine, Urine, Random Void (Completed)    Detrusor instability of bladder    VU (stress urinary incontinence, female)                                                 ASSESSMENT  RECURRENT UTIS /Overactive Bladder/Urinary Incontinence/IBS-C:  Mrs. Qing Greer is a pleasant 32-year-old female who RETURNS to clinic today for evaluation . This is her one month follow with concerns of recurrent UTIs, stress urinary incontinence/detrusor instability. On initial evaluation in clinic today, SHE REPORTS NUMEROUS OTHER CONCERNS and states nothing have changed since starting anticholinergic with Gemtesa 75 mg daily.  Patient reports she still has incontinent episodes but only 1-2 times a week.  Her last urine culture was completely negative for any growth, her urine dipstick today is completely negative for any infection, it is negative for gross/microscopic hematuria. Her PVR is 0 mL      OAB/URINE INCONTINENCE:Very pleasant patient evaluated in clinic today with mixed urinary incontinence symptoms that have been ongoing  and now gradually becoming very bothersome to her. She does have a history of recurrent UTIs, and REPORTS MANY  episodes of dysuria, burning on urination,BUT DENIES gross hematuria. Again, We discussed treatable and non-treatable causes of both stress and urge urinary incontinence.      With regards to stress urinary incontinence we discussed its relationship to childbirth and pelvic health.  We discussed the grading of stress incontinence with trying to quantitate the number of pads used.  We talked about leaking urine with laughing, lifting, coughing, and sexual intercourse. Talked about the Urge component and the concept of mixed incontinence where upon the stress is treatable, but the urge may exist and that 50% of the time the urge will resolve with treatment of the stress incontinence.  We talked with the diagnostic workup including a  postvoid residual urine, OR even a simple cystometrogram.  I discussed the findings that may be neurologically related including commonly seen with multiple sclerosis, Parkinson's disease, and stroke.  I talked about the various therapeutic options including anticholinergics, beta 3 agonists, and alpha blockade if there is a component of obstruction.  I discussed the side effects of anti-cholinergic including dry mouth, double vision-PATIENT REPORTS GEMTESA DID NOT WORK FOR HER-SYMPTOMS PERSIST     Recurrent urinary tract infections: We discussed the types of organisms that are found in the urinary tract indicating that the vast majority are results of the patient's own gastrointestinal alexandra.  We discussed how many of the antibiotics that are utilized can actually exacerbate these infections by creating resistant organisms and there is only a very few antibiotics that are concentrated in the urine and do not affect the rectal reservoir nor cause recurrent yeast vaginitis.      ACUTE CYSTITIS/UTI: We discussed the risk factors for recurrent infections being intercourse in younger patients and atrophic changes in older patients.  We discussed the symptoms that are found including pain, pressure, burning, frequency, urgency suprapubic pain and painful intercourse.  I discussed upper tract symptoms including fevers, chills, and indicated the workup would be much more aggressive if the patient were to present with recurrent infections in the face of upper tract symptomatology such as fever. I discussed the history of vesicoureteral reflux in young patients and finally chronic renal scarring as a result of such. -PT HAD NEGATIVE URINE CULTURES     IBS- Patient has significant irritable bowel syndrome, characterized by extreme amounts of constipation.  We spoke about the impact of this on bladder function.  We spoke about  its relationship to recurrent urinary tract infections.  We discussed the need for increasing p.o.  fluid intake to at least 2 to 3 L of water daily, and discussed the physiology of colonic motility as well as use of MiraLAX as a bulk laxative versus the newer class of serotonin uptake blockers such as Linzess.  We stressed the need for a daily bowel movement and discussed the Ball stool scale at length. We also discussed a referral to the GI nurse practitioner.    Interstitial Cystitis- FINALLY,  We discussed the diagnosis and management of this condition.  I indicated that it was a multifactorial condition with multifactorial symptomatology, Including frequency, urgency, the sensation of urinary tract infection in the absence of a positive culture, sexual symptomatology including significant dyspareunia.  We discussed treatment options including the importance of making a clinical diagnosis and the cystoscopic findings including Hunner's ulcers etc.  We also discussed medical management including pharmacologic treatment such as amitriptyline, naturopathic treatments such as pumpkin oil and which more aggressive options of Botox injections as well as the relative risks and merits of this.  We also discussed the use of dietary manipulation including the over-the-counter product relief which basically decreases the acid in the urine and avoidance of ascitic-containing foods such as citrus is etc.                                                    PLAN  We resent her urine for culture. I will call her with results if any bacteria growth      Will START  TRIMETHORPRIM 100 mg PO Daily -Suppressive Therapy ONLY  IF Positive URINE CULTURE      She HAS BEEN ENCOURAGED TO increase her p.o. fluid intake to at least 1 to 2 L daily and avoid bladder irritants such as caffeine products, spicy foods, and citrusy foods.      I recommend concomitant probiotics with treatment with antibiotics to protect the rectal reservoir including over-the-counter yogurt preparations to deonna oral pills containing the appropriate  probiotics. Patient reports the diligent use of Probiotics.     She is to also continue other medications for yeast vaginitis vaginitis as prescribed above.     STOP GEMTESSA 75 MG DAILY FOR DETRUSSOR INSTABILITY-SAMPLES GIVEN X 1 MONTH TRIAL     ALSO CONTINUE DAILY MIRALAX FOR IBS-C, DISCUSSED GI REFERRAL     Will see her back in 1 ONE WEEK FOR URODYNAMICS EVALUATION   SYMPTOMS PERSIST-SCHEDULED 03/15/23     Discussed upper tract investigation VIA CT STONE PROTOCOL-SCHEDULED     Discussed things she can do to help such as her weight control, keeping a bladder diary with strict intake and output of what she eats or drinks, how often she urinates, how much she urinates.       She should follow a timed voiding bladder training program, such as limiting the amount of time between bathroom breaks, using the bathroom at regular intervals such as every 2-3 hours.  And using techniques to suppress bladder urges.       Also discussed pelvic floor muscle exercises, and do Keagle exercises to strengthen the muscles to help control urination.     We will follow-up POST URODYNAMICS FOR DEFINITIVE PLAN OF CARE.     Patient is agreeable plan of care.    Patient reports that she is not currently experiencing any symptoms of urinary incontinence.    BMI is >= 30 and <35. (Class 1 Obesity). The following options were offered after discussion;: weight loss educational material (shared in after visit summary), exercise counseling/recommendations and nutrition counseling/recommendations    RADIOLOGY (CT AND/OR KUB):    CT Abdomen and Pelvis: No results found for this or any previous visit.     CT Stone Protocol: No results found for this or any previous visit.     KUB: Results for orders placed in visit on 02/03/23    XR Abdomen KUB    Narrative  EXAMINATION: XR ABDOMEN KUB-    CLINICAL INDICATION: back pain/ abdominal pain; N30.00-Acute cystitis  without hematuria; M54.50-Low back pain, unspecified; G89.29-Other  chronic pain;  R10.30-Lower abdominal pain, unspecified      COMPARISON: None available    FINDINGS: 2 views of the abdomen    Moderate to large stool burden    Surgical clips in the right upper quadrant    No suspicious calcifications are seen    Tiny calcification is seen in the left hemipelvis of indeterminate  etiology.    This report was finalized on 2/3/2023 2:37 PM by Dr. Lopez Rueda MD.       LABS (3 MONTHS):    Office Visit on 03/08/2023   Component Date Value Ref Range Status   • Color 03/08/2023 Yellow  Yellow, Straw, Dark Yellow, Guadalupe Final   • Clarity, UA 03/08/2023 Clear  Clear Final   • Specific Gravity  03/08/2023 1.015  1.005 - 1.030 Final   • pH, Urine 03/08/2023 6.5  5.0 - 8.0 Final   • Leukocytes 03/08/2023 Negative  Negative Final   • Nitrite, UA 03/08/2023 Negative  Negative Final   • Protein, POC 03/08/2023 Negative  Negative mg/dL Final   • Glucose, UA 03/08/2023 Negative  Negative mg/dL Final   • Ketones, UA 03/08/2023 Negative  Negative Final   • Urobilinogen, UA 03/08/2023 Normal  Normal, 0.2 E.U./dL Final   • Bilirubin 03/08/2023 Negative  Negative Final   • Blood, UA 03/08/2023 Negative  Negative Final   • Lot Number 03/08/2023 n   Final   • Expiration Date 03/08/2023 n   Final   • Urine Culture 03/08/2023 Culture in progress   Preliminary   Office Visit on 02/03/2023   Component Date Value Ref Range Status   • Color 02/03/2023 Yellow  Yellow, Straw, Dark Yellow, Guadalupe Final   • Clarity, UA 02/03/2023 Clear  Clear Final   • Specific Gravity  02/03/2023 1.005  1.005 - 1.030 Final   • pH, Urine 02/03/2023 6.0  5.0 - 8.0 Final   • Leukocytes 02/03/2023 Negative  Negative Final   • Nitrite, UA 02/03/2023 Negative  Negative Final   • Protein, POC 02/03/2023 Negative  Negative mg/dL Final   • Glucose, UA 02/03/2023 Negative  Negative mg/dL Final   • Ketones, UA 02/03/2023 Negative  Negative Final   • Urobilinogen, UA 02/03/2023 Normal  Normal, 0.2 E.U./dL Final   • Bilirubin 02/03/2023 Negative  Negative  Final   • Blood, UA 02/03/2023 Negative  Negative Final   • Lot Number 02/03/2023 [id number]   Final   • Expiration Date 02/03/2023 2/8/24   Final   • Urine Culture 02/03/2023 No growth   Final      Smoking Cessation Counseling:  Current every day smoker. less than 3 minutes spent counseling. Not agreeable to stopping.  Patient refuses to stop tobacco use and refuses counseling.    Follow Up   Return in about 1 week (around 3/15/2023) for Next scheduled follow up, RECURRENT UTI/DYSURIA/DETRUSSOR INSTABILITY-FOR URODYNAMICS.    Patient was given instructions and counseling regarding her condition or for health maintenance advice. Please see specific information pulled into the AVS if appropriate.          This document has been electronically signed by Griselda Cheng-Akwa, APRN   March 9, 2023 13:50 EST      Dictated Utilizing Dragon Dictation: Part of this note may be an electronic transcription/translation of spoken language to printed text using the Dragon Dictation System.     Transcribed from ambient dictation for Griselda Cheng-Akwa, APRN by Korey Martinez.  03/08/23   12:52 EST    Patient or patient representative verbalized consent to the visit recording.  I have personally performed the services described in this document as transcribed by the above individual, and it is both accurate and complete.  Griselda Cheng-Akwa, APRN  3/9/2023  14:56 EST

## 2023-03-10 LAB — BACTERIA SPEC AEROBE CULT: ABNORMAL

## 2023-03-13 ENCOUNTER — TELEPHONE (OUTPATIENT)
Dept: UROLOGY | Facility: CLINIC | Age: 33
End: 2023-03-13
Payer: COMMERCIAL

## 2023-03-13 NOTE — TELEPHONE ENCOUNTER
I called the pt with results  ----- Message from Griselda Cheng-Akwa, APRN sent at 3/13/2023  9:08 AM EDT -----  Please kindly let patient know urine culture showed lactobacillus species , which are normal urogenital alexandra.  She may drop off another urine sample if she becomes symptomatic if not follow-up in clinic on 3/15 for urodynamics as discussed thank you

## 2023-03-15 ENCOUNTER — PROCEDURE VISIT (OUTPATIENT)
Dept: UROLOGY | Facility: CLINIC | Age: 33
End: 2023-03-15
Payer: COMMERCIAL

## 2023-03-15 VITALS
HEIGHT: 63 IN | WEIGHT: 182.1 LBS | DIASTOLIC BLOOD PRESSURE: 70 MMHG | BODY MASS INDEX: 32.27 KG/M2 | SYSTOLIC BLOOD PRESSURE: 112 MMHG

## 2023-03-15 DIAGNOSIS — N39.3 SUI (STRESS URINARY INCONTINENCE, FEMALE): ICD-10-CM

## 2023-03-15 DIAGNOSIS — R33.9 RETENTION OF URINE: ICD-10-CM

## 2023-03-15 PROCEDURE — 51797 INTRAABDOMINAL PRESSURE TEST: CPT | Performed by: UROLOGY

## 2023-03-15 PROCEDURE — 51741 ELECTRO-UROFLOWMETRY FIRST: CPT | Performed by: UROLOGY

## 2023-03-15 PROCEDURE — 51784 ANAL/URINARY MUSCLE STUDY: CPT | Performed by: UROLOGY

## 2023-03-15 PROCEDURE — 51728 CYSTOMETROGRAM W/VP: CPT | Performed by: UROLOGY

## 2023-03-15 NOTE — PROGRESS NOTES
PATIENT HERE FOR URODYNAMICS STUDY WITH ANGEL NGUYEN with me in attendance. SEE ATTACHMENT FOR RESULTS

## 2023-03-16 ENCOUNTER — HOSPITAL ENCOUNTER (OUTPATIENT)
Dept: CT IMAGING | Facility: HOSPITAL | Age: 33
Discharge: HOME | End: 2023-03-16
Admitting: NURSE PRACTITIONER
Payer: COMMERCIAL

## 2023-03-16 PROCEDURE — 74176 CT ABD & PELVIS W/O CONTRAST: CPT

## 2023-03-16 PROCEDURE — 74176 CT ABD & PELVIS W/O CONTRAST: CPT | Performed by: RADIOLOGY

## 2023-03-17 ENCOUNTER — TELEPHONE (OUTPATIENT)
Dept: UROLOGY | Facility: CLINIC | Age: 33
End: 2023-03-17
Payer: COMMERCIAL

## 2023-03-17 NOTE — TELEPHONE ENCOUNTER
I called the patient and let her know and she said that she making her use the bathroom    ----- Message from Griselda Cheng-Akwa, APRN sent at 3/17/2023  3:38 PM EDT -----  Please let patient know CT scan results unremarkable at this time.  No kidney stones, no mass, tell her to continue daily MiraLAX  for her severe constipation.  Follow-up in clinic as discussed thank you    IMPRESSION:  1. Moderate to large stool burden  2.Other findings as above. No obstructive uropathy or urolithiasis

## 2023-03-29 ENCOUNTER — OFFICE VISIT (OUTPATIENT)
Dept: UROLOGY | Facility: CLINIC | Age: 33
End: 2023-03-29
Payer: COMMERCIAL

## 2023-03-29 VITALS
HEART RATE: 96 BPM | DIASTOLIC BLOOD PRESSURE: 84 MMHG | WEIGHT: 182 LBS | BODY MASS INDEX: 32.25 KG/M2 | SYSTOLIC BLOOD PRESSURE: 136 MMHG

## 2023-03-29 DIAGNOSIS — N30.00 ACUTE CYSTITIS WITHOUT HEMATURIA: Primary | ICD-10-CM

## 2023-03-29 DIAGNOSIS — R33.9 INCOMPLETE BLADDER EMPTYING: ICD-10-CM

## 2023-03-29 DIAGNOSIS — R35.0 FREQUENCY OF MICTURITION: ICD-10-CM

## 2023-03-29 DIAGNOSIS — N32.81 DETRUSOR INSTABILITY OF BLADDER: ICD-10-CM

## 2023-03-29 DIAGNOSIS — N35.92 STRICTURE OF FEMALE URETHRA, UNSPECIFIED STRICTURE TYPE: ICD-10-CM

## 2023-03-29 LAB
BILIRUB BLD-MCNC: NEGATIVE MG/DL
CLARITY, POC: CLEAR
COLOR UR: YELLOW
EXPIRATION DATE: NORMAL
GLUCOSE UR STRIP-MCNC: NEGATIVE MG/DL
KETONES UR QL: NEGATIVE
LEUKOCYTE EST, POC: NEGATIVE
Lab: NORMAL
NITRITE UR-MCNC: NEGATIVE MG/ML
PH UR: 5 [PH] (ref 5–8)
PROT UR STRIP-MCNC: NEGATIVE MG/DL
RBC # UR STRIP: NEGATIVE /UL
SP GR UR: 1.01 (ref 1–1.03)
UROBILINOGEN UR QL: NORMAL

## 2023-03-29 PROCEDURE — 87086 URINE CULTURE/COLONY COUNT: CPT | Performed by: NURSE PRACTITIONER

## 2023-03-29 PROCEDURE — 1159F MED LIST DOCD IN RCRD: CPT | Performed by: NURSE PRACTITIONER

## 2023-03-29 PROCEDURE — 1160F RVW MEDS BY RX/DR IN RCRD: CPT | Performed by: NURSE PRACTITIONER

## 2023-03-29 PROCEDURE — 99214 OFFICE O/P EST MOD 30 MIN: CPT | Performed by: NURSE PRACTITIONER

## 2023-03-29 RX ORDER — TAMSULOSIN HYDROCHLORIDE 0.4 MG/1
1 CAPSULE ORAL DAILY
Qty: 30 CAPSULE | Refills: 5 | Status: SHIPPED | OUTPATIENT
Start: 2023-03-29

## 2023-03-29 NOTE — PROGRESS NOTES
Chief Complaint  Acute Cystitis  and Urinary Incontinence (Review Urodynamics )    Subjective          Qing Greer presents to [facility] for ACUTE CYSTITIS/DI/URETHRAL OBSTRUCTION    History of Present Illness     Ms. Qing Greer is a pleasant 32-year-old female who returns to clinic today for evaluation, This is a 2-week follow-up to review urodynamics. The patient was initially evaluated in clinic on 03/08/2023 after presenting with numerous symptoms, most bothersome of which was recurrent UTIs, acute cystitis without hematuria, detrusor instability complicated by urine frequency, urgency, and incontinence episodes.     Prior to that, she was first seen on 02/03 in apparent discomfort with similar symptoms. The plan was to start her on antibiotic suppressive therapy with trimethoprim which was deferred due to her negative culture. But due to significant detrusor instability, she was started on anticholinergic with Gemtesa, WITH  samples given for trial. She returned to clinic for reevaluation, reporting she had not noted any improvement in her symptoms since starting Gemtesa, HENCE SHE DID stop the medication as well.     Also her next step was to proceed with urodynamics which she completed. Overall, she states that she was doing well. She had a urodynamic evaluation done on 03/15/2023. Recommendations by Dr. Gates was a urethral stretch. So the patient will follow up with Dr. Gates for urethral stretch.      She would like to know what a urethral stretch is. She reports that she can void but does not feel she empties her bladder. The patient reports she has a low pain threshold. She denies taking Gemtesa and notes it was not helpful.    The patient affirms having constipation. She has been taking MiraLAX and over-the-counter stool softeners. She notes she had watery diarrhea which is frustrating. She denies reviewing the CT scan with me.    The patient  reports she takes prazosin at night.    She denies having a hysterectomy.    We both reviewed/discussed her KUB /CT SCAN results which are unremarkable with no dilation. GASTROINTESTINAL TRACT: Abundant COLONIC stool.  This is characterized by extreme amounts of constipation.  We spoke about the impact of this on bladder function.  We spoke about  its relationship to recurrent urinary tract infections, vaginal pain, pelvic/bladder pressure, back pain, flank pain and also urinary incontinence she is experiencing.   IMPRESSION:  1. Moderate to large stool burden  2.Other findings as above. No obstructive uropathy or urolithiasis    I also recommend daily MiraLAX to help decrease the stool burden that may be causing some bladder pressure and worsening her voiding dysfunction.    Patient is agreeable plan of care    Objective   Vital Signs:   /84   Pulse 96   Wt 82.6 kg (182 lb)   BMI 32.25 kg/m²       ROS:   Review of Systems   Constitutional: Positive for fatigue. Negative for activity change, appetite change, diaphoresis, fever, unexpected weight gain and unexpected weight loss.   HENT: Negative for congestion, ear discharge, ear pain, nosebleeds, rhinorrhea, sinus pressure and sore throat.    Eyes: Negative for blurred vision, double vision, photophobia, pain, redness and visual disturbance.   Respiratory: Negative for apnea, cough, chest tightness, shortness of breath, wheezing and stridor.    Cardiovascular: Negative for chest pain and palpitations.   Gastrointestinal: Positive for constipation. Negative for abdominal distention, abdominal pain, diarrhea, nausea and vomiting.   Endocrine: Negative for polydipsia, polyphagia and polyuria.   Genitourinary: Positive for flank pain, frequency, pelvic pressure, urgency and urinary incontinence. Negative for decreased urine volume, difficulty urinating, dysuria and hematuria.   Musculoskeletal: Positive for back pain. Negative for arthralgias and joint  swelling.   Skin: Negative for pallor, rash and wound.   Neurological: Positive for headache. Negative for dizziness, tremors, syncope, weakness, light-headedness, memory problem and confusion.   Hematological: Bruises/bleeds easily.   Psychiatric/Behavioral: Negative for behavioral problems. The patient is nervous/anxious.         Physical Exam  Constitutional:       General: She is in acute distress.      Appearance: She is well-developed. She is obese. She is ill-appearing.   HENT:      Head: Normocephalic and atraumatic.   Eyes:      Pupils: Pupils are equal, round, and reactive to light.   Neck:      Thyroid: No thyromegaly.      Trachea: No tracheal deviation.   Cardiovascular:      Rate and Rhythm: Normal rate and regular rhythm.      Heart sounds: No murmur heard.  Pulmonary:      Effort: Pulmonary effort is normal. No respiratory distress.      Breath sounds: Normal breath sounds. No stridor. No wheezing.   Abdominal:      General: Bowel sounds are normal. There is distension.      Palpations: Abdomen is soft.      Tenderness: There is no abdominal tenderness.   Genitourinary:     Labia:         Right: No tenderness.         Left: No tenderness.       Vagina: Normal. No vaginal discharge.      Comments: URETHRAL OBSTRUCTION/DI/FREQUENCY  Musculoskeletal:         General: Tenderness present. No deformity. Normal range of motion.      Cervical back: Normal range of motion.   Skin:     General: Skin is warm and dry.      Capillary Refill: Capillary refill takes less than 2 seconds.      Coloration: Skin is pale.      Findings: No erythema or rash.   Neurological:      Mental Status: She is alert and oriented to person, place, and time.      Cranial Nerves: No cranial nerve deficit.      Sensory: No sensory deficit.      Motor: Weakness present.      Coordination: Coordination normal.   Psychiatric:         Behavior: Behavior normal.         Thought Content: Thought content normal.         Judgment: Judgment  normal.        Result Review :     UA    Urinalysis 2/3/23 3/8/23 3/29/23   Ketones, UA Negative Negative Negative   Leukocytes, UA Negative Negative Negative           Urine Culture    Urine Culture 2/3/23 3/8/23 3/29/23   Urine Culture No growth 25,000 CFU/mL Lactobacillus species (A) No growth (P)   (A) Abnormal value       Comments are available for some flowsheets but are not being displayed.                Urodynamic testing on 03/15/2023:  Voiding cystometry showed during the filling phase, her bladder displayed Increased sensation and then normal bladder capacity. There was evidence of 1 involuntary bladder contraction early in the filling that caused urge urinary incontinence. The detrusor was otherwise stable. She had no stress urinary incontinence with coughing of valsalva when her bladder was filled with capacity of 250 cc. During her voiding phase, the bladder displayed a very strong detrusor contraction with no evidence of abdominal muscle recruitment to assist her in voiding. Her flow was very weak with intermittency of the rate. Her postvoid residual was greater than 258 cc and her EMG was normal, with differential diagnosis of detrusor instability secondary to some form of urinary outlet obstruction and increased sensation.    Her urine dipstick today still showed trace leukocyte esterase. It is negative for nitrite, it is negative for gross/microscopic hematuria. Last urine culture did show lactobacillus species which were normal urogenital alexandra.    CT scan of abdomen and pelvis done on 03/16/2023 was unremarkable besides moderate to large stool burden. There was no focal mass or significant bladder wall thickening. Kidneys had no mass, no obstructive uropathy. There was no evidence of urolithiasis.    Assessment and Plan    Problem List Items Addressed This Visit        Genitourinary and Reproductive     Acute cystitis without hematuria - Primary    Relevant Medications    tamsulosin (FLOMAX) 0.4  MG capsule 24 hr capsule    Other Relevant Orders    Urine Culture - Urine, Urine, Clean Catch (Completed)    Frequency of micturition    Relevant Medications    tamsulosin (FLOMAX) 0.4 MG capsule 24 hr capsule    Other Relevant Orders    POC Urinalysis Dipstick, Automated (Completed)    Detrusor instability of bladder    Relevant Medications    tamsulosin (FLOMAX) 0.4 MG capsule 24 hr capsule   Other Visit Diagnoses     Stricture of female urethra, unspecified stricture type        Relevant Medications    tamsulosin (FLOMAX) 0.4 MG capsule 24 hr capsule    Incomplete bladder emptying        Relevant Medications    tamsulosin (FLOMAX) 0.4 MG capsule 24 hr capsule        Constipation  - Continue using MiraLAX.  - Samples of given.                                                  ASSESSMENT  RECURRENT UTIS /Overactive Bladder/Urinary Incontinence/IBS-C:  Mrs. Qing Greer is a pleasant 32-year-old female who RETURNS to clinic today for evaluation . This is her one month follow with concerns of recurrent UTIs, stress urinary incontinence/detrusor instability. On initial evaluation in clinic today, SHE REPORTS NUMEROUS OTHER CONCERNS and states nothing have changed since starting anticholinergic with Gemtesa 75 mg daily-STOPPED.  Patient reports she still has incontinent episodes but only 1-2 times a week.  Her last urine culture was completely negative for any growth, her urine dipstick today is completely negative for any infection, it is negative for gross/microscopic hematuria. Her PVR is 0 mL    Discussed her urodynamic results, and need for ureteral stretch      OAB/URINE INCONTINENCE:Very pleasant patient evaluated in clinic today with mixed urinary incontinence symptoms that have been ongoing  and now gradually becoming very bothersome to her. She does have a history of recurrent UTIs, and REPORTS MANY  episodes of dysuria, burning on urination,BUT DENIES gross hematuria. Again, We discussed treatable and  non-treatable causes of both stress and urge urinary incontinence.      With regards to stress urinary incontinence we discussed its relationship to childbirth and pelvic health.  We discussed the grading of stress incontinence with trying to quantitate the number of pads used.  We talked about leaking urine with laughing, lifting, coughing, and sexual intercourse. Talked about the Urge component and the concept of mixed incontinence where upon the stress is treatable, but the urge may exist and that 50% of the time the urge will resolve with treatment of the stress incontinence.  We talked with the diagnostic workup including a postvoid residual urine, OR even a simple cystometrogram.  I discussed the findings that may be neurologically related including commonly seen with multiple sclerosis, Parkinson's disease, and stroke.  I talked about the various therapeutic options including anticholinergics, beta 3 agonists, and alpha blockade if there is a component of obstruction.  I discussed the side effects of anti-cholinergic including dry mouth, double vision-PATIENT REPORTS GEMTESA DID NOT WORK FOR HER-SYMPTOMS PERSIST     Recurrent urinary tract infections: We discussed the types of organisms that are found in the urinary tract indicating that the vast majority are results of the patient's own gastrointestinal alexandra.  We discussed how many of the antibiotics that are utilized can actually exacerbate these infections by creating resistant organisms and there is only a very few antibiotics that are concentrated in the urine and do not affect the rectal reservoir nor cause recurrent yeast vaginitis.      ACUTE CYSTITIS/UTI: We discussed the risk factors for recurrent infections being intercourse in younger patients and atrophic changes in older patients.  We discussed the symptoms that are found including pain, pressure, burning, frequency, urgency suprapubic pain and painful intercourse.  I discussed upper tract  symptoms including fevers, chills, and indicated the workup would be much more aggressive if the patient were to present with recurrent infections in the face of upper tract symptomatology such as fever. I discussed the history of vesicoureteral reflux in young patients and finally chronic renal scarring as a result of such. -PT HAD NEGATIVE URINE CULTURES     IBS- Patient has significant irritable bowel syndrome, characterized by extreme amounts of constipation.  We spoke about the impact of this on bladder function.  We spoke about  its relationship to recurrent urinary tract infections.  We discussed the need for increasing p.o. fluid intake to at least 2 to 3 L of water daily, and discussed the physiology of colonic motility as well as use of MiraLAX as a bulk laxative versus the newer class of serotonin uptake blockers such as Linzess.  We stressed the need for a daily bowel movement and discussed the Bates stool scale at length. We also discussed a referral to the GI nurse practitioner.                                                       PLAN  Patient has been scheduled WITH  Dr. Gates-evaluation for ureteral dilatation secondary to stricture per urodynamic recommendations.    We resent her urine for culture. I will call her with results if any bacteria growth      Will START  TRIMETHORPRIM 100 mg PO Daily -Suppressive Therapy ONLY  IF Positive URINE CULTURE      She HAS BEEN ENCOURAGED TO increase her p.o. fluid intake to at least 1 to 2 L daily and avoid bladder irritants such as caffeine products, spicy foods, and citrusy foods.      I recommend concomitant probiotics with treatment with antibiotics to protect the rectal reservoir including over-the-counter yogurt preparations to deonna oral pills containing the appropriate probiotics. Patient reports the diligent use of Probiotics.     She is to also continue other medications for yeast vaginitis vaginitis as prescribed above.     ALSO CONTINUE DAILY  MIRALAX FOR IBS-C, DISCUSSED GI REFERRAL     Will see her back in 1 ONE WEEK FOR URODYNAMICS EVALUATION   SYMPTOMS PERSIST-SCHEDULED 03/15/23     Discussed things she can do to help such as her weight control, keeping a bladder diary with strict intake and output of what she eats or drinks, how often she urinates, how much she urinates.       She should follow a timed voiding bladder training program, such as limiting the amount of time between bathroom breaks, using the bathroom at regular intervals such as every 2-3 hours.  And using techniques to suppress bladder urges.       Also discussed pelvic floor muscle exercises, and do Keagle exercises to strengthen the muscles to help control urination.     We will follow-up POST procedure with Dr. Gates 05/01/23    She may return sooner if symptoms worsen     Patient is agreeable plan of care.    Patient reports that she is not currently experiencing any symptoms of urinary incontinence.    BMI is >= 30 and <35. (Class 1 Obesity). The following options were offered after discussion;: weight loss educational material (shared in after visit summary), exercise counseling/recommendations and nutrition counseling/recommendations      RADIOLOGY (CT AND/OR KUB):    CT Abdomen and Pelvis: No results found for this or any previous visit.     CT Stone Protocol: Results for orders placed in visit on 02/03/23    CT Abdomen Pelvis Stone Protocol    Narrative  EXAM: CT ABDOMEN PELVIS STONE PROTOCOL-      TECHNIQUE: Multiple axial CT images were obtained from lung bases  through pubic symphysis WITHOUT administration of IV contrast.  Reformatted images in the coronal and/or sagittal plane(s) were  generated from the axial data set to facilitate diagnostic accuracy  and/or surgical planning.  Oral Contrast:NONE.    Radiation dose reduction techniques were utilized per ALARA protocol.  Automated exposure control was initiated through either or [employer] or  [employer] software packages  by  protocol.  DOSE: 893.37 mGy.cm    Clinical information Flank pain, kidney stone suspected; N30.00-Acute  cystitis without hematuria; M54.50-Low back pain, unspecified;  G89.29-Other chronic pain; R10.30-Lower abdominal pain, unspecified    Comparison none immediately available at this time    FINDINGS:    Lower thorax: Clear. No effusions.    Abdomen:    Liver: Homogeneous. No focal hepatic mass or ductal dilatation.    Gallbladder: Surgically absent    Pancreas: Unremarkable. No mass or ductal dilatation.    Spleen: Homogeneous. No splenomegaly.    Adrenals: No mass.    Kidneys/ureters: No mass. No obstructive uropathy.  No evidence of  urolithiasis.    GI tract: Moderate stool.    MESENTERY: No free fluid, walled off fluid collections, mesenteric  stranding, or enlarged lymph nodes      Vasculature: No evidence of aneurysm.    Abdominal wall: No focal hernia or mass.      Bladder: No focal mass or significant wall thickening    Reproductive: Unremarkable as visualized    Bones: No acute bony abnormality.    Impression  1. Moderate to large stool burden    2.Other findings as above. No obstructive uropathy or urolithiasis              This report was finalized on 3/17/2023 8:23 AM by Dr. Lopez Rueda MD.     KUB: Results for orders placed in visit on 02/03/23    XR Abdomen KUB    Narrative  EXAMINATION: XR ABDOMEN KUB-    CLINICAL INDICATION: back pain/ abdominal pain; N30.00-Acute cystitis  without hematuria; M54.50-Low back pain, unspecified; G89.29-Other  chronic pain; R10.30-Lower abdominal pain, unspecified      COMPARISON: None available    FINDINGS: 2 views of the abdomen    Moderate to large stool burden    Surgical clips in the right upper quadrant    No suspicious calcifications are seen    Tiny calcification is seen in the left hemipelvis of indeterminate  etiology.    This report was finalized on 2/3/2023 2:37 PM by Dr. Lopez Rueda MD.       LABS (3 MONTHS):    Office Visit on  03/29/2023   Component Date Value Ref Range Status   • Urine Culture 03/29/2023 No growth   Preliminary   • Color 03/29/2023 Yellow  Yellow, Straw, Dark Yellow, Guadalupe Final   • Clarity, UA 03/29/2023 Clear  Clear Final   • Specific Gravity  03/29/2023 1.015  1.005 - 1.030 Final   • pH, Urine 03/29/2023 5.0  5.0 - 8.0 Final   • Leukocytes 03/29/2023 Negative  Negative Final   • Nitrite, UA 03/29/2023 Negative  Negative Final   • Protein, POC 03/29/2023 Negative  Negative mg/dL Final   • Glucose, UA 03/29/2023 Negative  Negative mg/dL Final   • Ketones, UA 03/29/2023 Negative  Negative Final   • Urobilinogen, UA 03/29/2023 Normal  Normal, 0.2 E.U./dL Final   • Bilirubin 03/29/2023 Negative  Negative Final   • Blood, UA 03/29/2023 Negative  Negative Final   • Lot Number 03/29/2023 n   Final   • Expiration Date 03/29/2023 n   Final   Office Visit on 03/08/2023   Component Date Value Ref Range Status   • Color 03/08/2023 Yellow  Yellow, Straw, Dark Yellow, Guadalupe Final   • Clarity, UA 03/08/2023 Clear  Clear Final   • Specific Gravity  03/08/2023 1.015  1.005 - 1.030 Final   • pH, Urine 03/08/2023 6.5  5.0 - 8.0 Final   • Leukocytes 03/08/2023 Negative  Negative Final   • Nitrite, UA 03/08/2023 Negative  Negative Final   • Protein, POC 03/08/2023 Negative  Negative mg/dL Final   • Glucose, UA 03/08/2023 Negative  Negative mg/dL Final   • Ketones, UA 03/08/2023 Negative  Negative Final   • Urobilinogen, UA 03/08/2023 Normal  Normal, 0.2 E.U./dL Final   • Bilirubin 03/08/2023 Negative  Negative Final   • Blood, UA 03/08/2023 Negative  Negative Final   • Lot Number 03/08/2023 n   Final   • Expiration Date 03/08/2023 n   Final   • Urine Culture 03/08/2023 25,000 CFU/mL Lactobacillus species (A)   Final      Based on laboratory diagnosis criteria, these organisms are common urogenital commensal alexandra and have not been associated with urinary tract infections; clinical correlation is recommended.   Office Visit on 02/03/2023    Component Date Value Ref Range Status   • Color 02/03/2023 Yellow  Yellow, Straw, Dark Yellow, Guadalupe Final   • Clarity, UA 02/03/2023 Clear  Clear Final   • Specific Gravity  02/03/2023 1.005  1.005 - 1.030 Final   • pH, Urine 02/03/2023 6.0  5.0 - 8.0 Final   • Leukocytes 02/03/2023 Negative  Negative Final   • Nitrite, UA 02/03/2023 Negative  Negative Final   • Protein, POC 02/03/2023 Negative  Negative mg/dL Final   • Glucose, UA 02/03/2023 Negative  Negative mg/dL Final   • Ketones, UA 02/03/2023 Negative  Negative Final   • Urobilinogen, UA 02/03/2023 Normal  Normal, 0.2 E.U./dL Final   • Bilirubin 02/03/2023 Negative  Negative Final   • Blood, UA 02/03/2023 Negative  Negative Final   • Lot Number 02/03/2023 [id number]   Final   • Expiration Date 02/03/2023 2/8/24   Final   • Urine Culture 02/03/2023 No growth   Final        Smoking Cessation Counseling:  Electronic cigarette user. less than 3 minutes spent counseling. Not agreeable to stopping.  Patient refuses to stop tobacco use and refuses counseling.    Follow Up   Return in about 1 month (around 5/1/2023) for Next scheduled follow up, With Dr. Gates, Cystoscopy-URETHRAL DILATION PER URODYNAMICS-OAB/DI.    Patient was given instructions and counseling regarding her condition or for health maintenance advice. Please see specific information pulled into the AVS if appropriate.          This document has been electronically signed by Griselda Cheng-Akwa, APRN   March 30, 2023 17:41 EDT      Dictated Utilizing Dragon Dictation: Part of this note may be an electronic transcription/translation of spoken language to printed text using the Dragon Dictation System.    Transcribed from ambient dictation for Griselda Cheng-Akwa, APRN by Lizet Talbot.  03/29/23   17:39 EDT    Patient or patient representative verbalized consent to the visit recording.  I have personally performed the services described in this document as transcribed by the above individual,  and it is both accurate and complete.  Griselda Cheng-Akwa, APRN  3/30/2023  17:41 EDT

## 2023-03-30 LAB — BACTERIA SPEC AEROBE CULT: NO GROWTH

## 2023-03-31 ENCOUNTER — TELEPHONE (OUTPATIENT)
Dept: UROLOGY | Facility: CLINIC | Age: 33
End: 2023-03-31
Payer: COMMERCIAL

## 2023-03-31 NOTE — TELEPHONE ENCOUNTER
I called and spoke to the patient letting her know her urine culture was negative and to follow up with Dr. Gates. Patient verbalized understanding.                 ----- Message from Griselda Cheng-Akwa, APRN sent at 3/30/2023  1:30 PM EDT -----  Please let patient know her urine culture results are completely negative at this time.  Follow-up in clinic as discussed with Dr. Gates.  RAYSA JOSEPH

## 2023-04-06 ENCOUNTER — TELEPHONE (OUTPATIENT)
Dept: UROLOGY | Facility: CLINIC | Age: 33
End: 2023-04-06
Payer: COMMERCIAL

## 2023-04-06 DIAGNOSIS — R33.9 INCOMPLETE BLADDER EMPTYING: Primary | ICD-10-CM

## 2023-04-06 NOTE — TELEPHONE ENCOUNTER
Amador gave her samples of Trulance to try and told her if it worked she would call her some in. The patient is asking for that to be called in.    [facility] in [place]

## 2023-04-14 ENCOUNTER — TELEPHONE (OUTPATIENT)
Dept: UROLOGY | Facility: CLINIC | Age: 33
End: 2023-04-14
Payer: COMMERCIAL

## 2023-04-14 NOTE — TELEPHONE ENCOUNTER
PA for Trulance was APPROVED!        PA Case: [id number], Status: Approved, Coverage Starts on: 4/19/2023 12:00:00 AM, Coverage Ends on: 4/18/2024 12:00:00 AM.

## 2023-04-28 ENCOUNTER — TELEPHONE (OUTPATIENT)
Dept: UROLOGY | Facility: CLINIC | Age: 33
End: 2023-04-28

## 2023-05-01 ENCOUNTER — OFFICE VISIT (OUTPATIENT)
Dept: UROLOGY | Facility: CLINIC | Age: 33
End: 2023-05-01
Payer: COMMERCIAL

## 2023-05-01 VITALS
SYSTOLIC BLOOD PRESSURE: 142 MMHG | DIASTOLIC BLOOD PRESSURE: 83 MMHG | WEIGHT: 182 LBS | HEART RATE: 89 BPM | BODY MASS INDEX: 32.25 KG/M2 | HEIGHT: 63 IN

## 2023-05-01 DIAGNOSIS — N35.92 STRICTURE OF FEMALE URETHRA, UNSPECIFIED STRICTURE TYPE: Primary | ICD-10-CM

## 2023-05-01 RX ORDER — GENTAMICIN SULFATE 40 MG/ML
80 INJECTION, SOLUTION INTRAMUSCULAR; INTRAVENOUS ONCE
Status: COMPLETED | OUTPATIENT
Start: 2023-05-01 | End: 2023-05-01

## 2023-05-01 RX ADMIN — GENTAMICIN SULFATE 80 MG: 40 INJECTION, SOLUTION INTRAMUSCULAR; INTRAVENOUS at 15:29

## 2023-05-01 NOTE — PROGRESS NOTES
Chief Complaint:      Chief Complaint   Patient presents with   • Cystitis     Follow up    • Urethral Dilation        HPI:   32 y.o. female with a history of decreased force of stream needing to bend over etc. as well as an elevated residual status post urodynamics with a very slow flow rate is here for first dilation.    Past Medical History:     Past Medical History:   Diagnosis Date   • Abnormal Pap smear of cervix 2012   • STD (sexually transmitted disease) 2011    Chlamydia   • Urinary incontinence Last 3 months   • Urinary tract infection     Alot throughout my life       Current Meds:     Current Outpatient Medications   Medication Sig Dispense Refill   • divalproex (DEPAKOTE ER) 500 MG 24 hr tablet TAKE ONE TABLET BY MOUTH EVERY NIGHT AT BEDTIME FOR 30 DAYS     • pantoprazole (PROTONIX) 40 MG EC tablet Take 1 tablet by mouth Daily.     • Plecanatide 3 MG tablet Take 1 tablet by mouth As Needed (CONSTIPATION). 30 tablet 6   • polyethylene glycol (MiraLax) 17 g packet Take 17 g by mouth Daily. 72 each 5   • prazosin (MINIPRESS) 1 MG capsule TAKE ONE CAPSULE BY MOUTH EVERY NIGHT AT BEDTIME FOR 30 DAYS     • tamsulosin (FLOMAX) 0.4 MG capsule 24 hr capsule Take 1 capsule by mouth Daily. 30 capsule 5   • Vraylar 4.5 MG capsule Take 1 capsule by mouth Daily.       No current facility-administered medications for this visit.        Allergies:      Allergies   Allergen Reactions   • Macrobid [Nitrofurantoin] Itching   • Norco [Hydrocodone-Acetaminophen] Unknown - Low Severity        Past Surgical History:     Past Surgical History:   Procedure Laterality Date   • APPENDECTOMY  2018       Social History:     Social History     Socioeconomic History   • Marital status: Single   Tobacco Use   • Smoking status: Every Day     Types: Electronic Cigarette   Substance and Sexual Activity   • Alcohol use: Not Currently     Alcohol/week: 1.0 standard drink     Types: 1 Glasses of wine per week     Comment: [place]dom[place], but  Occasionally with meal   • Drug use: Never   • Sexual activity: Yes     Partners: Male       Family History:     Family History   Problem Relation Age of Onset   • Hypertension Mother    • Hypertension Maternal Grandmother        Review of Systems:     Review of Systems   Constitutional: Negative.  Negative for activity change, appetite change, chills, diaphoresis, fatigue and unexpected weight change.   HENT: Negative for congestion, dental problem, drooling, ear discharge, ear pain, facial swelling, hearing loss, mouth sores, nosebleeds, postnasal drip, rhinorrhea, sinus pressure, sneezing, sore throat, tinnitus, trouble swallowing and voice change.    Eyes: Negative.  Negative for photophobia, pain, discharge, redness, itching and visual disturbance.   Respiratory: Negative.  Negative for apnea, cough, choking, chest tightness, shortness of breath, wheezing and stridor.    Cardiovascular: Negative.  Negative for chest pain, palpitations and leg swelling.   Gastrointestinal: Negative.  Negative for abdominal distention, abdominal pain, anal bleeding, blood in stool, constipation, diarrhea, nausea, rectal pain and vomiting.   Endocrine: Negative.  Negative for cold intolerance, heat intolerance, polydipsia, polyphagia and polyuria.   Musculoskeletal: Negative.  Negative for arthralgias, back pain, gait problem, joint swelling, myalgias, neck pain and neck stiffness.   Skin: Negative.  Negative for color change, pallor, rash and wound.   Allergic/Immunologic: Negative.  Negative for environmental allergies, food allergies and immunocompromised state.   Neurological: Negative.  Negative for dizziness, tremors, seizures, syncope, facial asymmetry, speech difficulty, weakness, light-headedness, numbness and headaches.   Hematological: Negative.  Negative for adenopathy. Does not bruise/bleed easily.   Psychiatric/Behavioral: Negative for agitation, behavioral problems, confusion, decreased concentration, dysphoric  mood, hallucinations, self-injury, sleep disturbance and suicidal ideas. The patient is not nervous/anxious and is not hyperactive.    All other systems reviewed and are negative.      Physical Exam:     Physical Exam  Constitutional:       Appearance: She is well-developed.   HENT:      Head: Normocephalic and atraumatic.      Right Ear: External ear normal.      Left Ear: External ear normal.   Eyes:      Conjunctiva/sclera: Conjunctivae normal.      Pupils: Pupils are equal, round, and reactive to light.   Cardiovascular:      Rate and Rhythm: Normal rate and regular rhythm.      Heart sounds: Normal heart sounds.   Pulmonary:      Effort: Pulmonary effort is normal.      Breath sounds: Normal breath sounds.   Abdominal:      General: Bowel sounds are normal. There is no distension.      Palpations: Abdomen is soft. There is no mass.      Tenderness: There is no abdominal tenderness. There is no guarding or rebound.   Genitourinary:     General: Normal vulva.      Vagina: No vaginal discharge.   Musculoskeletal:         General: Normal range of motion.   Skin:     General: Skin is warm and dry.   Neurological:      Mental Status: She is alert.      Deep Tendon Reflexes: Reflexes are normal and symmetric.   Psychiatric:         Behavior: Behavior normal.         Thought Content: Thought content normal.         Judgment: Judgment normal.         I have reviewed the following portions of the patient's history: Allergies, current medications, past family history, past medical history, past social history, past surgical history, problem list, and ROS and confirm it is accurate.    Recent Image (CT and/or KUB):      CT Abdomen and Pelvis: No results found for this or any previous visit.       CT Stone Protocol: Results for orders placed in visit on 02/03/23    CT Abdomen Pelvis Stone Protocol    Narrative  EXAM: CT ABDOMEN PELVIS STONE PROTOCOL-      TECHNIQUE: Multiple axial CT images were obtained from lung  bases  through pubic symphysis WITHOUT administration of IV contrast.  Reformatted images in the coronal and/or sagittal plane(s) were  generated from the axial data set to facilitate diagnostic accuracy  and/or surgical planning.  Oral Contrast:NONE.    Radiation dose reduction techniques were utilized per ALARA protocol.  Automated exposure control was initiated through either or [employer] or  [employer] software packages by  protocol.  DOSE: 893.37 mGy.cm    Clinical information Flank pain, kidney stone suspected; N30.00-Acute  cystitis without hematuria; M54.50-Low back pain, unspecified;  G89.29-Other chronic pain; R10.30-Lower abdominal pain, unspecified    Comparison none immediately available at this time    FINDINGS:    Lower thorax: Clear. No effusions.    Abdomen:    Liver: Homogeneous. No focal hepatic mass or ductal dilatation.    Gallbladder: Surgically absent    Pancreas: Unremarkable. No mass or ductal dilatation.    Spleen: Homogeneous. No splenomegaly.    Adrenals: No mass.    Kidneys/ureters: No mass. No obstructive uropathy.  No evidence of  urolithiasis.    GI tract: Moderate stool.    MESENTERY: No free fluid, walled off fluid collections, mesenteric  stranding, or enlarged lymph nodes      Vasculature: No evidence of aneurysm.    Abdominal wall: No focal hernia or mass.      Bladder: No focal mass or significant wall thickening    Reproductive: Unremarkable as visualized    Bones: No acute bony abnormality.    Impression  1. Moderate to large stool burden    2.Other findings as above. No obstructive uropathy or urolithiasis              This report was finalized on 3/17/2023 8:23 AM by Dr. Lopez Rueda MD.       KUB: Results for orders placed in visit on 02/03/23    XR Abdomen KUB    Narrative  EXAMINATION: XR ABDOMEN KUB-    CLINICAL INDICATION: back pain/ abdominal pain; N30.00-Acute cystitis  without hematuria; M54.50-Low back pain, unspecified; G89.29-Other  chronic pain;  R10.30-Lower abdominal pain, unspecified      COMPARISON: None available    FINDINGS: 2 views of the abdomen    Moderate to large stool burden    Surgical clips in the right upper quadrant    No suspicious calcifications are seen    Tiny calcification is seen in the left hemipelvis of indeterminate  etiology.    This report was finalized on 2/3/2023 2:37 PM by Dr. Lopez Rueda MD.       Labs (past 3 months):      Office Visit on 03/29/2023   Component Date Value Ref Range Status   • Urine Culture 03/29/2023 No growth   Final   • Color 03/29/2023 Yellow  Yellow, Straw, Dark Yellow, Guadalupe Final   • Clarity, UA 03/29/2023 Clear  Clear Final   • Specific Gravity  03/29/2023 1.015  1.005 - 1.030 Final   • pH, Urine 03/29/2023 5.0  5.0 - 8.0 Final   • Leukocytes 03/29/2023 Negative  Negative Final   • Nitrite, UA 03/29/2023 Negative  Negative Final   • Protein, POC 03/29/2023 Negative  Negative mg/dL Final   • Glucose, UA 03/29/2023 Negative  Negative mg/dL Final   • Ketones, UA 03/29/2023 Negative  Negative Final   • Urobilinogen, UA 03/29/2023 Normal  Normal, 0.2 E.U./dL Final   • Bilirubin 03/29/2023 Negative  Negative Final   • Blood, UA 03/29/2023 Negative  Negative Final   • Lot Number 03/29/2023 n   Final   • Expiration Date 03/29/2023 n   Final   Office Visit on 03/08/2023   Component Date Value Ref Range Status   • Color 03/08/2023 Yellow  Yellow, Straw, Dark Yellow, Guadalupe Final   • Clarity, UA 03/08/2023 Clear  Clear Final   • Specific Gravity  03/08/2023 1.015  1.005 - 1.030 Final   • pH, Urine 03/08/2023 6.5  5.0 - 8.0 Final   • Leukocytes 03/08/2023 Negative  Negative Final   • Nitrite, UA 03/08/2023 Negative  Negative Final   • Protein, POC 03/08/2023 Negative  Negative mg/dL Final   • Glucose, UA 03/08/2023 Negative  Negative mg/dL Final   • Ketones, UA 03/08/2023 Negative  Negative Final   • Urobilinogen, UA 03/08/2023 Normal  Normal, 0.2 E.U./dL Final   • Bilirubin 03/08/2023 Negative  Negative Final   •  Blood, UA 03/08/2023 Negative  Negative Final   • Lot Number 03/08/2023 n   Final   • Expiration Date 03/08/2023 n   Final   • Urine Culture 03/08/2023 25,000 CFU/mL Lactobacillus species (A)   Final      Based on laboratory diagnosis criteria, these organisms are common urogenital commensal alexandra and have not been associated with urinary tract infections; clinical correlation is recommended.   Office Visit on 02/03/2023   Component Date Value Ref Range Status   • Color 02/03/2023 Yellow  Yellow, Straw, Dark Yellow, Guadalupe Final   • Clarity, UA 02/03/2023 Clear  Clear Final   • Specific Gravity  02/03/2023 1.005  1.005 - 1.030 Final   • pH, Urine 02/03/2023 6.0  5.0 - 8.0 Final   • Leukocytes 02/03/2023 Negative  Negative Final   • Nitrite, UA 02/03/2023 Negative  Negative Final   • Protein, POC 02/03/2023 Negative  Negative mg/dL Final   • Glucose, UA 02/03/2023 Negative  Negative mg/dL Final   • Ketones, UA 02/03/2023 Negative  Negative Final   • Urobilinogen, UA 02/03/2023 Normal  Normal, 0.2 E.U./dL Final   • Bilirubin 02/03/2023 Negative  Negative Final   • Blood, UA 02/03/2023 Negative  Negative Final   • Lot Number 02/03/2023 [id number]   Final   • Expiration Date 02/03/2023 2/8/24   Final   • Urine Culture 02/03/2023 No growth   Final        Procedure:     Urethral dilation-after an appropriate informed consent, the patient was brought to the procedure suite.  The urethra was gently anesthetized with 10 mL of 2% viscous Xylocaine jelly.  After an adequate period of topical anesthesia I went ahead and  the urethra was gently anesthetized and dilated with [place] sounds from 16 to 28 English sequentially without complication. The patient was given gentamicin as prophylaxis with 80 mg.  Assessment/Plan:   Posttraumatic urethral stenosis status post dilation as recommended per urodynamic protocol              This document has been electronically signed by ALICE SANTO MD May 1, 2023 15:18  EDT    Dictated Utilizing Dragon Dictation: Part of this note may be an electronic transcription/translation of spoken language to printed text using the Dragon Dictation System.

## 2023-05-02 PROBLEM — N35.92 STRICTURE OF FEMALE URETHRA: Status: ACTIVE | Noted: 2023-05-02

## 2023-05-03 ENCOUNTER — TELEPHONE (OUTPATIENT)
Dept: UROLOGY | Facility: CLINIC | Age: 33
End: 2023-05-03
Payer: COMMERCIAL

## 2023-05-22 ENCOUNTER — OFFICE VISIT (OUTPATIENT)
Dept: UROLOGY | Facility: CLINIC | Age: 33
End: 2023-05-22

## 2023-05-22 VITALS
DIASTOLIC BLOOD PRESSURE: 79 MMHG | BODY MASS INDEX: 33.38 KG/M2 | SYSTOLIC BLOOD PRESSURE: 128 MMHG | WEIGHT: 188.4 LBS | HEIGHT: 63 IN | HEART RATE: 95 BPM

## 2023-05-22 DIAGNOSIS — N99.12 POSTPROCEDURAL FEMALE URETHRAL STRICTURE: ICD-10-CM

## 2023-05-22 DIAGNOSIS — N32.81 DETRUSOR INSTABILITY OF BLADDER: Primary | ICD-10-CM

## 2023-05-22 PROCEDURE — 1160F RVW MEDS BY RX/DR IN RCRD: CPT | Performed by: UROLOGY

## 2023-05-22 PROCEDURE — 1159F MED LIST DOCD IN RCRD: CPT | Performed by: UROLOGY

## 2023-05-22 PROCEDURE — 99213 OFFICE O/P EST LOW 20 MIN: CPT | Performed by: UROLOGY

## 2023-05-22 NOTE — PROGRESS NOTES
Chief Complaint:    IC    HPI:   32 y.o. female.  Was diagnosed urodynamically with decreased flow secondary to urethral blockage and severe detrusor instability she currently gets up once to twice a night every 30 minutes during the day Gemtesa is unsuccessful bladder dilation is unsuccessful.  She is interested in a more permanent solution we discussed InterStim she would like to explore it further I will make a referral to Dr. Jr Montanez  Past Medical History:     Past Medical History:   Diagnosis Date   • Abnormal Pap smear of cervix 2012   • STD (sexually transmitted disease) 2011    Chlamydia   • Urinary incontinence Last 3 months   • Urinary tract infection     Alot throughout my life       Current Meds:     Current Outpatient Medications   Medication Sig Dispense Refill   • divalproex (DEPAKOTE ER) 500 MG 24 hr tablet TAKE ONE TABLET BY MOUTH EVERY NIGHT AT BEDTIME FOR 30 DAYS     • pantoprazole (PROTONIX) 40 MG EC tablet Take 1 tablet by mouth Daily.     • Plecanatide 3 MG tablet Take 1 tablet by mouth As Needed (CONSTIPATION). 30 tablet 6   • polyethylene glycol (MiraLax) 17 g packet Take 17 g by mouth Daily. 72 each 5   • prazosin (MINIPRESS) 1 MG capsule TAKE ONE CAPSULE BY MOUTH EVERY NIGHT AT BEDTIME FOR 30 DAYS     • tamsulosin (FLOMAX) 0.4 MG capsule 24 hr capsule Take 1 capsule by mouth Daily. 30 capsule 5   • Vraylar 4.5 MG capsule Take 1 capsule by mouth Daily.       No current facility-administered medications for this visit.        Allergies:      Allergies   Allergen Reactions   • Macrobid [Nitrofurantoin] Itching   • Norco [Hydrocodone-Acetaminophen] Unknown - Low Severity        Past Surgical History:     Past Surgical History:   Procedure Laterality Date   • APPENDECTOMY  2018       Social History:     Social History     Socioeconomic History   • Marital status: Single   Tobacco Use   • Smoking status: Every Day     Types: Electronic Cigarette   Substance and Sexual Activity   • Alcohol  use: Not Currently     Alcohol/week: 1.0 standard drink     Types: 1 Glasses of wine per week     Comment: [place]dom[place], but Occasionally with meal   • Drug use: Never   • Sexual activity: Yes     Partners: Male       Family History:     Family History   Problem Relation Age of Onset   • Hypertension Mother    • Hypertension Maternal Grandmother        Review of Systems:     Review of Systems   Constitutional: Negative.  Negative for activity change, appetite change, chills, diaphoresis, fatigue, fever and unexpected weight change.   HENT: Negative.  Negative for congestion, dental problem, drooling, ear discharge, ear pain, facial swelling, hearing loss, mouth sores, nosebleeds, postnasal drip, rhinorrhea, sinus pressure, sneezing, sore throat, tinnitus, trouble swallowing and voice change.    Eyes: Negative.  Negative for photophobia, pain, discharge, redness, itching and visual disturbance.   Respiratory: Negative.  Negative for apnea, cough, choking, chest tightness, shortness of breath, wheezing and stridor.    Cardiovascular: Negative.  Negative for chest pain, palpitations and leg swelling.   Gastrointestinal: Negative.  Negative for abdominal distention, abdominal pain, anal bleeding, blood in stool, constipation, diarrhea, nausea, rectal pain and vomiting.   Endocrine: Negative.  Negative for cold intolerance, heat intolerance, polydipsia, polyphagia and polyuria.   Genitourinary: Positive for frequency and urgency.   Musculoskeletal: Negative.  Negative for arthralgias, back pain, gait problem, joint swelling, myalgias, neck pain and neck stiffness.   Skin: Negative.  Negative for color change, pallor, rash and wound.   Allergic/Immunologic: Negative.  Negative for environmental allergies, food allergies and immunocompromised state.   Neurological: Negative.  Negative for dizziness, tremors, seizures, syncope, facial asymmetry, speech difficulty, weakness, light-headedness, numbness and headaches.    Hematological: Negative.  Negative for adenopathy. Does not bruise/bleed easily.   Psychiatric/Behavioral: Negative.  Negative for agitation, behavioral problems, confusion, decreased concentration, dysphoric mood, hallucinations, self-injury, sleep disturbance and suicidal ideas. The patient is not nervous/anxious and is not hyperactive.    All other systems reviewed and are negative.      Physical Exam:     Physical Exam  Constitutional:       Appearance: She is well-developed.   HENT:      Head: Normocephalic and atraumatic.      Right Ear: External ear normal.      Left Ear: External ear normal.   Eyes:      Conjunctiva/sclera: Conjunctivae normal.      Pupils: Pupils are equal, round, and reactive to light.   Cardiovascular:      Rate and Rhythm: Normal rate and regular rhythm.      Heart sounds: Normal heart sounds.   Pulmonary:      Effort: Pulmonary effort is normal.      Breath sounds: Normal breath sounds.   Abdominal:      General: Bowel sounds are normal. There is no distension.      Palpations: Abdomen is soft. There is no mass.      Tenderness: There is no abdominal tenderness. There is no guarding or rebound.   Genitourinary:     Vagina: No vaginal discharge.   Musculoskeletal:         General: Normal range of motion.   Skin:     General: Skin is warm and dry.   Neurological:      Mental Status: She is alert.      Deep Tendon Reflexes: Reflexes are normal and symmetric.   Psychiatric:         Behavior: Behavior normal.         Thought Content: Thought content normal.         Judgment: Judgment normal.         I have reviewed the following portions of the patient's history: Allergies, current medications, past family history, past medical history, past social history, past surgical history, problem list, and ROS and confirm it is accurate.    Recent Image (CT and/or KUB):      CT Abdomen and Pelvis: No results found for this or any previous visit.       CT Stone Protocol: Results for orders placed in  visit on 02/03/23    CT Abdomen Pelvis Stone Protocol    Narrative  EXAM: CT ABDOMEN PELVIS STONE PROTOCOL-      TECHNIQUE: Multiple axial CT images were obtained from lung bases  through pubic symphysis WITHOUT administration of IV contrast.  Reformatted images in the coronal and/or sagittal plane(s) were  generated from the axial data set to facilitate diagnostic accuracy  and/or surgical planning.  Oral Contrast:NONE.    Radiation dose reduction techniques were utilized per ALARA protocol.  Automated exposure control was initiated through either or [employer] or  [employer] software packages by  protocol.  DOSE: 893.37 mGy.cm    Clinical information Flank pain, kidney stone suspected; N30.00-Acute  cystitis without hematuria; M54.50-Low back pain, unspecified;  G89.29-Other chronic pain; R10.30-Lower abdominal pain, unspecified    Comparison none immediately available at this time    FINDINGS:    Lower thorax: Clear. No effusions.    Abdomen:    Liver: Homogeneous. No focal hepatic mass or ductal dilatation.    Gallbladder: Surgically absent    Pancreas: Unremarkable. No mass or ductal dilatation.    Spleen: Homogeneous. No splenomegaly.    Adrenals: No mass.    Kidneys/ureters: No mass. No obstructive uropathy.  No evidence of  urolithiasis.    GI tract: Moderate stool.    MESENTERY: No free fluid, walled off fluid collections, mesenteric  stranding, or enlarged lymph nodes      Vasculature: No evidence of aneurysm.    Abdominal wall: No focal hernia or mass.      Bladder: No focal mass or significant wall thickening    Reproductive: Unremarkable as visualized    Bones: No acute bony abnormality.    Impression  1. Moderate to large stool burden    2.Other findings as above. No obstructive uropathy or urolithiasis              This report was finalized on 3/17/2023 8:23 AM by Dr. Lopez Rueda MD.       KUB: Results for orders placed in visit on 02/03/23    XR Abdomen KUB    Narrative  EXAMINATION: XR  ABDOMEN KUB-    CLINICAL INDICATION: back pain/ abdominal pain; N30.00-Acute cystitis  without hematuria; M54.50-Low back pain, unspecified; G89.29-Other  chronic pain; R10.30-Lower abdominal pain, unspecified      COMPARISON: None available    FINDINGS: 2 views of the abdomen    Moderate to large stool burden    Surgical clips in the right upper quadrant    No suspicious calcifications are seen    Tiny calcification is seen in the left hemipelvis of indeterminate  etiology.    This report was finalized on 2/3/2023 2:37 PM by Dr. Lopez Rueda MD.       Labs (past 3 months):      Office Visit on 03/29/2023   Component Date Value Ref Range Status   • Urine Culture 03/29/2023 No growth   Final   • Color 03/29/2023 Yellow  Yellow, Straw, Dark Yellow, Guadalupe Final   • Clarity, UA 03/29/2023 Clear  Clear Final   • Specific Gravity  03/29/2023 1.015  1.005 - 1.030 Final   • pH, Urine 03/29/2023 5.0  5.0 - 8.0 Final   • Leukocytes 03/29/2023 Negative  Negative Final   • Nitrite, UA 03/29/2023 Negative  Negative Final   • Protein, POC 03/29/2023 Negative  Negative mg/dL Final   • Glucose, UA 03/29/2023 Negative  Negative mg/dL Final   • Ketones, UA 03/29/2023 Negative  Negative Final   • Urobilinogen, UA 03/29/2023 Normal  Normal, 0.2 E.U./dL Final   • Bilirubin 03/29/2023 Negative  Negative Final   • Blood, UA 03/29/2023 Negative  Negative Final   • Lot Number 03/29/2023 n   Final   • Expiration Date 03/29/2023 n   Final   Office Visit on 03/08/2023   Component Date Value Ref Range Status   • Color 03/08/2023 Yellow  Yellow, Straw, Dark Yellow, Guadalupe Final   • Clarity, UA 03/08/2023 Clear  Clear Final   • Specific Gravity  03/08/2023 1.015  1.005 - 1.030 Final   • pH, Urine 03/08/2023 6.5  5.0 - 8.0 Final   • Leukocytes 03/08/2023 Negative  Negative Final   • Nitrite, UA 03/08/2023 Negative  Negative Final   • Protein, POC 03/08/2023 Negative  Negative mg/dL Final   • Glucose, UA 03/08/2023 Negative  Negative mg/dL Final    • Ketones, UA 03/08/2023 Negative  Negative Final   • Urobilinogen, UA 03/08/2023 Normal  Normal, 0.2 E.U./dL Final   • Bilirubin 03/08/2023 Negative  Negative Final   • Blood, UA 03/08/2023 Negative  Negative Final   • Lot Number 03/08/2023 n   Final   • Expiration Date 03/08/2023 n   Final   • Urine Culture 03/08/2023 25,000 CFU/mL Lactobacillus species (A)   Final      Based on laboratory diagnosis criteria, these organisms are common urogenital commensal alexandra and have not been associated with urinary tract infections; clinical correlation is recommended.        Procedure:       Assessment/Plan:   Detrusor instability-patient has been diagnosed with detrusor instability which is an irritative bladder symptomatology most likely related to factors such as intake of bladder irritants, postinfectious irritation, prolapse, with a very large differential diagnosis.  The mainstay of treatment has been tight cholinergics which basically cause the bladder to have decreased contractility.  We have discussed the side effects of these treatments including dry mouth, double vision, and increasing constipation.  She has failed oral anticholinergics.  She still has significant voiding dysfunction and the dilation did not help at all.  She gets up every 30 minutes during the day she is interested in a permanent solution we discussed InterStim we will make referral for discussion and a consultation.  She has had a complete urodynamic investigation        This document has been electronically signed by ALICE SNATO MD May 22, 2023 15:16 EDT    Dictated Utilizing Dragon Dictation: Part of this note may be an electronic transcription/translation of spoken language to printed text using the Dragon Dictation System.

## 2023-06-01 DIAGNOSIS — N32.81 DETRUSOR INSTABILITY OF BLADDER: Primary | ICD-10-CM

## 2023-07-14 ENCOUNTER — TELEPHONE (OUTPATIENT)
Dept: UROLOGY | Facility: CLINIC | Age: 33
End: 2023-07-14

## 2023-07-14 NOTE — TELEPHONE ENCOUNTER
Called patient and answered her questions about MyChart message she is aware she has cystoscopy scheduled with Dr. Montanez.

## 2023-07-14 NOTE — TELEPHONE ENCOUNTER
Caller: ELMER PINTO    Relationship: SELF    Best call back number: [phone]     PT RETURNING CALL TO ANDREA. I ADVISED PT ANDREA LEFT HER A MYCHART MESSAGE AS WELL.

## 2023-08-08 RX ORDER — PHENTERMINE HYDROCHLORIDE 37.5 MG/1
TABLET ORAL
COMMUNITY
Start: 2023-04-13

## 2023-08-08 NOTE — PRE-PROCEDURE INSTRUCTIONS
PAT phone history completed with pt for upcoming procedure on 8/9/23.  Pt instructed does not need to be NPO since this is a case done under local anesthesia.

## 2023-08-09 ENCOUNTER — ANESTHESIA EVENT (OUTPATIENT)
Dept: PREOP | Facility: HOSPITAL | Age: 33
End: 2023-08-09

## 2023-08-09 ENCOUNTER — HOSPITAL ENCOUNTER (OUTPATIENT)
Dept: PREOP | Facility: HOSPITAL | Age: 33
Discharge: HOME | End: 2023-08-09
Payer: COMMERCIAL

## 2023-08-09 ENCOUNTER — ANESTHESIA (OUTPATIENT)
Dept: PREOP | Facility: HOSPITAL | Age: 33
End: 2023-08-09

## 2023-08-09 VITALS
OXYGEN SATURATION: 97 % | RESPIRATION RATE: 18 BRPM | TEMPERATURE: 97.7 F | HEART RATE: 80 BPM | SYSTOLIC BLOOD PRESSURE: 128 MMHG | DIASTOLIC BLOOD PRESSURE: 78 MMHG

## 2023-08-09 DIAGNOSIS — N32.81 OAB (OVERACTIVE BLADDER): Primary | ICD-10-CM

## 2023-08-09 LAB
B-HCG UR QL: NEGATIVE
EXPIRATION DATE: NORMAL
INTERNAL NEGATIVE CONTROL: NORMAL
INTERNAL POSITIVE CONTROL: NORMAL
Lab: NORMAL

## 2023-08-09 PROCEDURE — C1897 LEAD, NEUROSTIM TEST KIT: HCPCS

## 2023-08-09 PROCEDURE — 81025 URINE PREGNANCY TEST: CPT | Performed by: UROLOGY

## 2023-08-09 PROCEDURE — C1787 PATIENT PROGR, NEUROSTIM: HCPCS

## 2023-08-09 RX ORDER — LIDOCAINE HYDROCHLORIDE AND EPINEPHRINE 10; 10 MG/ML; UG/ML
40 INJECTION, SOLUTION INFILTRATION; PERINEURAL ONCE
Status: COMPLETED | OUTPATIENT
Start: 2023-08-09 | End: 2023-08-09

## 2023-08-09 RX ORDER — SULFAMETHOXAZOLE AND TRIMETHOPRIM 800; 160 MG/1; MG/1
1 TABLET ORAL ONCE
Status: COMPLETED | OUTPATIENT
Start: 2023-08-09 | End: 2023-08-09

## 2023-08-09 RX ADMIN — SULFAMETHOXAZOLE AND TRIMETHOPRIM 1 TABLET: 800; 160 TABLET ORAL at 12:50

## 2023-08-09 RX ADMIN — LIDOCAINE HYDROCHLORIDE AND EPINEPHRINE 40 ML: 10; 10 INJECTION, SOLUTION INFILTRATION; PERINEURAL at 15:01

## 2023-08-09 NOTE — PROCEDURES
"Procedure Note     SURGEON: Jr Montanez MD    PREOPERATIVE DIAGNOSIS: Refractory Urge Urinary Incontinence     POSTOPERATIVE DIAGNOSIS: Same     PROCEDURE PERFORMED:   1. Basic Evaluation (PNE) without fluoroscopy using the enhanced VerifyT System - (CPT Code: 23025-98), bilateral lead placement    ANESTHESIA: Lidocaine 2%     BLOOD LOSS: Minimal.     SPECIMEN: None.     COMPLICATION: None.     INDICATION FOR PROCEDURE: Chris Greer is a 33 y.o. y.o.-year-old with Refractory Overactive bladder / UUI.  After discussion a decision was made to proceed with a trial of InterStim placement with basic evaluation.     DESCRIPTION OF THE PROCEDURE:   The Patient was properly identified and placed in prone position. Pillows were placed under lower abdomen to flatten sacrum and under shins to allow the toes to dangle freely. A ground pad was placed on the bottom of the patient's foot and the proximal ends of the test stimulation cable were connected to the ground pad and the external neurostimulator (ENS). Patient was prepped and draped in usual sterile fashion.   The sciatic notches and sacral midline were identified via palpation. The level of S3 was identified by measuring 9cm from the tip of the coccyx. Local injection of 2% Lidocaine was administered at foramen needle entry point located 2cm lateral to the sacral midline and 2cm cephalad of sciatic notch level. A foramen needle was introduced at an approximate 60 degree angle, feeling for the foraminal margins until S3 was identified. Proper needle position was confirmed by the patient identifying location of stimulation sensation; and direct observation of the lifting of the perineum or "bellowing," and plantar flexion of the great toe utilizing the test stimulation cable, ENS and VerifyT .     The foramen needle stylet was removed, and a percutaneous lead was inserted to proper depth identified by markers on the lead. The lead was tested by connecting " the test stimulation cable to the proximal lead electrode and testing with the ENS and . Upon response confirmation, the foramen needle was removed by sliding over the percutaneous lead. The foramen needle and lead stylet were removed while securing lead location. Retesting to confirm appropriate lead response was completed.   The above procedure was performed again on the contralateral side.     The leads were connected to the patient cables. The Patient's skin was cleaned and external cabling was secured by covering with transparent dressing. Estimated blood loss was minimal. Post procedure, the patient was programmed with the ENS and VerifyT  to optimum sensation via the lead and provided utilization instructions prior to discharge. Patient will complete a bladder diary during testing period to help document results of this procedure. The patient will follow up in 1 week for full interstim implantation if they have >50% benefit.

## 2023-08-09 NOTE — DISCHARGE INSTRUCTIONS
Home Care After Interstim Test Leads  The following instructions will help you care for yourself, or be cared for upon your return home today.  These are guidelines for your care right after the procedure only.     Diet  Continue your regular diet today.    Activity  Start normal activities in twenty-four (24) hours  Try to avoid any extreme physical activity while the leads are in.    Wound Care and Hygiene  Front showering or sponge bath is ok    What to Expect after Procedure  Soreness over the needle site  Mild bleeding at the needle site.    Call your Doctor  Severe pain not controlled by oral medication  Temperature above 101.5 degrees  Inability to urinate within eight (8) hours after surgery  Drainage from the incision    Other Contacts  Urology Office:  [id number]   [place] [address]  [phone] office  [phone] fax    Follow up Appointment  You have a follow up scheduled next week

## 2023-08-10 ENCOUNTER — TELEPHONE (OUTPATIENT)
Dept: UROLOGY | Facility: CLINIC | Age: 33
End: 2023-08-10

## 2023-08-10 NOTE — TELEPHONE ENCOUNTER
Please have patient call Tomasa the InterStim rep and go over with her how to turn down her stimulation or switch it to the other side.

## 2023-08-10 NOTE — TELEPHONE ENCOUNTER
Caller: PT    Relationship to Patient: SELF    Pharmacy: [facility] [place] [address] PHONE NUMBER FOR [facility] [phone]      Phone Number: [phone]    Reason for Call: RECEIVED A CALL FROM PT. C/O CONSTANT R SIDE PAIN DOWN TOWARD BUTTOCKS. ON A SCALE OF 1-10 (1 BEING THE LEAST AND 10 THE WORSE) PT RATED PAIN TOLERANCE IS 8    When was the patient last seen: 8/9/23 FOR PROCEDURE PERCUTANEOUS NERVE EVALUATION     What makes it worse: SITTING OR STANDING     What makes it better: LAYING DOWN    What therapies/medications have you tried: TAKING TYLENOL AS NEEDED. IT IS NOT HELPING

## 2023-08-10 NOTE — TELEPHONE ENCOUNTER
Called and spoke with patient and she had messaged brennan and was waiting on a second response but the patient said if the pain didn't get any better she was going to go to the ER. I advised her if she went to the ER to come to the one here.

## 2023-08-11 ENCOUNTER — TELEPHONE (OUTPATIENT)
Dept: UROLOGY | Facility: CLINIC | Age: 33
End: 2023-08-11

## 2023-08-11 ENCOUNTER — OFFICE VISIT (OUTPATIENT)
Dept: UROLOGY | Facility: CLINIC | Age: 33
End: 2023-08-11
Payer: COMMERCIAL

## 2023-08-11 VITALS
WEIGHT: 188 LBS | OXYGEN SATURATION: 98 % | DIASTOLIC BLOOD PRESSURE: 86 MMHG | SYSTOLIC BLOOD PRESSURE: 130 MMHG | HEART RATE: 109 BPM | HEIGHT: 63 IN | RESPIRATION RATE: 17 BRPM | BODY MASS INDEX: 33.31 KG/M2

## 2023-08-11 DIAGNOSIS — N32.81 OAB (OVERACTIVE BLADDER): Primary | ICD-10-CM

## 2023-08-11 PROCEDURE — 99024 POSTOP FOLLOW-UP VISIT: CPT | Performed by: NURSE PRACTITIONER

## 2023-08-11 NOTE — TELEPHONE ENCOUNTER
Caller: ELMER     Relationship to patient: SELF    Best call back number: [phone]    Patient is needing: PT I STILL HAVING PAIN AND NEEDS TO KNOW WHAT SHE SHOULD DO.  INTERSTIM TURNED OFF YESTERDAY.

## 2023-08-11 NOTE — PROGRESS NOTES
"       Office Visit General Established Female Patient     Patient Name: Qing Greer  : 1990   MRN: [id number]     Chief Complaint:   Chief Complaint   Patient presents with    Post-op       Referring Provider: No ref. provider found    History of Present Illness: Qing Greer is a 33 y.o. female with history below in assessment, who presents for PNE lead removal.  Patient had PNE inserted on  call the office on 8/10 reporting pain she was instructed to call [email or web address] rep's and from there and instructed to turn off the device to see if this stimulation was the cause of her pain she calls back today has turned off the device and is still having severe back pain.  She states that she has practically been unable to get out of bed to urinate.        Subjective      Review of System:   As noted in HPI     Past Medical History:   Past Medical History:   Diagnosis Date    Abnormal Pap smear of cervix     Seizure     1 \"unexplained seizure\" about 13 years ago - per pt 23    STD (sexually transmitted disease)     Chlamydia    Urinary incontinence Last 3 months    Urinary tract infection     Alot throughout my life       Past Surgical History:   Past Surgical History:   Procedure Laterality Date    APPENDECTOMY  2018    LAPAROSCOPIC CHOLECYSTECTOMY      SKIN BIOPSY      \"pre-cancerous\" moles removed    TONSILLECTOMY      and adenoids       Family History:   Family History   Problem Relation Age of Onset    Hypertension Mother     Hypertension Maternal Grandmother        Social History:   Social History     Socioeconomic History    Marital status: Single   Tobacco Use    Smoking status: Every Day     Types: Electronic Cigarette   Vaping Use    Vaping Use: Every day    Substances: Nicotine, Flavoring   Substance and Sexual Activity    Alcohol use: Not Currently     Alcohol/week: 1.0 standard drink     Types: 1 Glasses of wine per week    Drug use: Never    Sexual activity: Defer     Partners: Male " "      Medications:     Current Outpatient Medications:     divalproex (DEPAKOTE ER) 500 MG 24 hr tablet, TAKE ONE TABLET BY MOUTH EVERY NIGHT AT BEDTIME FOR 30 DAYS, Disp: , Rfl:     pantoprazole (PROTONIX) 40 MG EC tablet, Take 1 tablet by mouth Daily., Disp: , Rfl:     phentermine (ADIPEX-P) 37.5 MG tablet, , Disp: , Rfl:     Plecanatide 3 MG tablet, Take 1 tablet by mouth As Needed (CONSTIPATION)., Disp: 30 tablet, Rfl: 6    polyethylene glycol (MiraLax) 17 g packet, Take 17 g by mouth Daily., Disp: 72 each, Rfl: 5    prazosin (MINIPRESS) 1 MG capsule, TAKE ONE CAPSULE BY MOUTH EVERY NIGHT AT BEDTIME FOR 30 DAYS, Disp: , Rfl:     Vraylar 4.5 MG capsule, Take 1 capsule by mouth Daily., Disp: , Rfl:     Allergies:   Allergies   Allergen Reactions    Macrobid [Nitrofurantoin] Itching    Norco [Hydrocodone-Acetaminophen] Hives and Itching       Objective     Physical Exam:   Vital Signs:   Visit Vitals  /86 (BP Location: Left arm, Patient Position: Sitting, Cuff Size: Adult)   Pulse 109   Resp 17   Ht 160 cm (62.99\")   Wt 85.3 kg (188 lb)   LMP 06/08/2023 (Approximate) Comment: on hormone that makes periods irregular   SpO2 98%   BMI 33.31 kg/mý      Body mass index is 33.31 kg/mý.     Physical exam notable for no redness or swelling at lead insertion site, no discharge or drainage, nontender to touch.    Labs  Brief Urine Lab Results  (Last result in the past 365 days)        Color   Clarity   Blood   Leuk Est   Nitrite   Protein   CREAT   Urine HCG        08/09/23 1242               Negative               Lab Results   Component Value Date    GLUCOSE 98 08/03/2014    CALCIUM 9.7 08/03/2014     08/03/2014    K 3.6 08/03/2014    CO2 22 08/03/2014     (H) 08/03/2014    BUN 10 08/03/2014    CREATININE 0.6 08/03/2014    ANIONGAP 12 (H) 08/03/2014       Lab Results   Component Value Date    WBC 8.60 08/03/2014    HGB 14.7 08/03/2014    HCT 43.7 08/03/2014    MCV 83.2 08/03/2014     08/03/2014 "       Urine Culture          2/3/2023    13:38 3/8/2023    10:53 3/29/2023    15:56   Urine Culture   Urine Culture No growth  25,000 CFU/mL Lactobacillus species  No growth         Radiographic Studies  No Images in the past 120 days found..    I have reviewed the above labs and imaging.     Assessment / Plan      Assessment/Plan:   Diagnoses and all orders for this visit:    1. OAB (overactive bladder) (Primary)    Patient had went for PNE trial for OAB due to extreme discomfort as she has decided to discontinue the week trial and have the leads removed today.  We did discuss the potential that this is related to back pain patient reports she has not had any back injuries.  Leads were removed and intact, 4 x 4 dressing with tape placed over insertion site, patient tolerated well.  We did briefly discuss stress incontinence that she will return to the office when her back is not hurting for vaginal exam.      Follow Up:   No follow-ups on file.    CHARISSA Pitt, NP-C  [facility]

## 2023-08-28 ENCOUNTER — TELEPHONE (OUTPATIENT)
Dept: CARDIOLOGY | Facility: CLINIC | Age: 33
End: 2023-08-28
Payer: COMMERCIAL

## 2023-08-28 ENCOUNTER — OFFICE VISIT (OUTPATIENT)
Dept: CARDIOLOGY | Facility: CLINIC | Age: 33
End: 2023-08-28
Payer: COMMERCIAL

## 2023-08-28 ENCOUNTER — PATIENT ROUNDING (BHMG ONLY) (OUTPATIENT)
Dept: CARDIOLOGY | Facility: CLINIC | Age: 33
End: 2023-08-28
Payer: COMMERCIAL

## 2023-08-28 VITALS
BODY MASS INDEX: 34.52 KG/M2 | HEIGHT: 63 IN | DIASTOLIC BLOOD PRESSURE: 79 MMHG | RESPIRATION RATE: 16 BRPM | OXYGEN SATURATION: 100 % | SYSTOLIC BLOOD PRESSURE: 137 MMHG | WEIGHT: 194.8 LBS | HEART RATE: 103 BPM

## 2023-08-28 DIAGNOSIS — E78.5 DYSLIPIDEMIA: ICD-10-CM

## 2023-08-28 DIAGNOSIS — R06.02 SHORTNESS OF BREATH: Primary | ICD-10-CM

## 2023-08-28 DIAGNOSIS — F17.211 CIGARETTE NICOTINE DEPENDENCE IN REMISSION: ICD-10-CM

## 2023-08-28 DIAGNOSIS — F17.290 VAPING NICOTINE DEPENDENCE, TOBACCO PRODUCT: ICD-10-CM

## 2023-08-28 DIAGNOSIS — R60.0 BILATERAL LEG EDEMA: ICD-10-CM

## 2023-08-28 PROCEDURE — 99204 OFFICE O/P NEW MOD 45 MIN: CPT | Performed by: SPECIALIST

## 2023-08-28 PROCEDURE — 93000 ELECTROCARDIOGRAM COMPLETE: CPT | Performed by: SPECIALIST

## 2023-08-28 RX ORDER — TOPIRAMATE 25 MG/1
25 CAPSULE, COATED PELLETS ORAL 2 TIMES DAILY
COMMUNITY

## 2023-08-28 RX ORDER — PROGESTERONE 100 MG/1
100 CAPSULE ORAL WEEKLY
COMMUNITY
Start: 2023-08-15

## 2023-08-28 RX ORDER — OMEPRAZOLE 20 MG/1
20 CAPSULE, DELAYED RELEASE ORAL DAILY
COMMUNITY

## 2023-08-28 RX ORDER — DESLORATADINE 5 MG/1
5 TABLET ORAL DAILY
COMMUNITY

## 2023-08-28 RX ORDER — LANOLIN ALCOHOL/MO/W.PET/CERES
2500 CREAM (GRAM) TOPICAL DAILY
COMMUNITY

## 2023-08-28 RX ORDER — DIVALPROEX SODIUM 250 MG/1
250 TABLET, EXTENDED RELEASE ORAL DAILY
COMMUNITY

## 2023-08-28 RX ORDER — BACLOFEN 10 MG/1
5 TABLET ORAL 3 TIMES DAILY
COMMUNITY

## 2023-08-28 RX ORDER — CLONIDINE HYDROCHLORIDE 0.1 MG/1
0.1 TABLET ORAL 2 TIMES DAILY
COMMUNITY

## 2023-08-28 NOTE — PROGRESS NOTES
Subjective   Initial consultation, LE edema, shortness of breath  Qing Greer is a 33 y.o. female who presents to day for cardiac eval (Vague sx's, edema,weight gain etc) and Med Management (List from phone).    CHIEF COMPLIANT  Chief Complaint   Patient presents with    cardiac eval     Vague sx's, edema,weight gain etc    Med Management     List from phone       Active Problems:  Problem List Items Addressed This Visit          Cardiac and Vasculature    Dyslipidemia       Pulmonary and Pneumonias    Shortness of breath - Primary    Relevant Orders    Adult Transthoracic Echo Complete w/ Color, Spectral and Contrast if necessary per protocol    Comprehensive Metabolic Panel       Symptoms and Signs    Bilateral leg edema    Relevant Orders    Adult Transthoracic Echo Complete w/ Color, Spectral and Contrast if necessary per protocol    Comprehensive Metabolic Panel       Tobacco    Cigarette nicotine dependence in remission       Other    Vaping nicotine dependence, tobacco product       HPI  HPI  Patient states that the for a little bit over 6 months she has been having shortness of breath doing mild work like housework and otherwise with intermittent extremity edema including her EXTR and lower extremities as well as upper extremities she use also to smoke for almost 6 years almost 2 packs/day she quitted about 2 and half months ago now she vapes no chest pain she does have rare palpitations with heart skipping a little bit, she is never been diagnosed with hypertension and no history of diabetes but she has been recently diagnosed with high cholesterol but she is not taking medicine for that she is denying orthopnea or PND, family history wise her mother has a pacemaker her maternal grandmother heart has coronary artery disease as well as pacemaker and multiple ablation procedures in the past her brother has taken some medicine for prevention of some heart condition  PRIOR MEDS  Current Outpatient  "Medications on File Prior to Visit   Medication Sig Dispense Refill    baclofen (LIORESAL) 10 MG tablet Take 0.5 tablets by mouth 3 (Three) Times a Day.      cloNIDine (CATAPRES) 0.1 MG tablet Take 1 tablet by mouth 2 (Two) Times a Day.      desloratadine (CLARINEX) 5 MG tablet Take 1 tablet by mouth Daily.      divalproex (DEPAKOTE ER) 250 MG 24 hr tablet Take 1 tablet by mouth Daily.      divalproex (DEPAKOTE ER) 500 MG 24 hr tablet 1 tablet 2 (Two) Times a Day.      omeprazole (priLOSEC) 20 MG capsule Take 1 capsule by mouth Daily.      phentermine (ADIPEX-P) 37.5 MG tablet       Progesterone (PROMETRIUM) 100 MG capsule Take 1 capsule by mouth 1 (One) Time Per Week.      vitamin B-12 (CYANOCOBALAMIN) 1000 MCG tablet Take 2.5 tablets by mouth Daily.      [DISCONTINUED] Plecanatide 3 MG tablet Take 1 tablet by mouth As Needed (CONSTIPATION). 30 tablet 6    [DISCONTINUED] prazosin (MINIPRESS) 1 MG capsule 3 capsules. Takes 1mg and 2mg      topiramate (TOPAMAX) 25 MG capsule (sprinkle) Take 1 capsule by mouth 2 (Two) Times a Day.      Vraylar 4.5 MG capsule Take 1 capsule by mouth Daily.      [DISCONTINUED] pantoprazole (PROTONIX) 40 MG EC tablet Take 1 tablet by mouth Daily.      [DISCONTINUED] polyethylene glycol (MiraLax) 17 g packet Take 17 g by mouth Daily. (Patient not taking: Reported on 8/28/2023) 72 each 5     No current facility-administered medications on file prior to visit.       ALLERGIES  Macrobid [nitrofurantoin] and Norco [hydrocodone-acetaminophen]    HISTORY  Past Medical History:   Diagnosis Date    Abnormal Pap smear of cervix 2012    Seizure     1 \"unexplained seizure\" about 13 years ago - per pt 8/8/23    STD (sexually transmitted disease) 2011    Chlamydia    Urinary incontinence Last 3 months    Urinary tract infection     Alot throughout my life       Social History     Socioeconomic History    Marital status: Single   Tobacco Use    Smoking status: Every Day     Types: Electronic Cigarette " "   Smokeless tobacco: Never   Vaping Use    Vaping Use: Every day    Substances: Nicotine, Flavoring   Substance and Sexual Activity    Alcohol use: Not Currently     Alcohol/week: 1.0 standard drink     Types: 1 Glasses of wine per week    Drug use: Never    Sexual activity: Defer     Partners: Male       Family History   Problem Relation Age of Onset    Arrhythmia Mother     Hypertension Mother     Heart attack Maternal Grandmother     Arrhythmia Maternal Grandmother     Hypertension Maternal Grandmother        Review of Systems   Respiratory:  Positive for shortness of breath. Negative for apnea, cough, choking, chest tightness, wheezing and stridor.    Cardiovascular:  Positive for palpitations and leg swelling. Negative for chest pain.     Objective     VITALS: /79 (BP Location: Right arm, Cuff Size: Adult)   Pulse 103   Resp 16   Ht 160 cm (63\")   Wt 88.4 kg (194 lb 12.8 oz)   LMP 06/08/2023 (Approximate) Comment: on hormone that makes periods irregular  SpO2 100%   BMI 34.51 kg/mý     LABS:   Lab Results (most recent)       None            IMAGING:   No Images in the past 120 days found..    EXAM:  Physical Exam  Vitals reviewed.   Constitutional:       Appearance: She is well-developed.   HENT:      Head: Normocephalic and atraumatic.   Eyes:      Pupils: Pupils are equal, round, and reactive to light.   Neck:      Thyroid: No thyromegaly.      Vascular: No JVD.   Cardiovascular:      Rate and Rhythm: Normal rate and regular rhythm.      Heart sounds: Normal heart sounds. No murmur heard.    No friction rub. No gallop.      Comments: No edema  Pulmonary:      Effort: Pulmonary effort is normal. No respiratory distress.      Breath sounds: Normal breath sounds. No stridor. No wheezing or rales.   Chest:      Chest wall: No tenderness.   Musculoskeletal:         General: No tenderness or deformity.      Cervical back: Neck supple.   Skin:     General: Skin is warm and dry.   Neurological:      " Mental Status: She is alert and oriented to person, place, and time.      Cranial Nerves: No cranial nerve deficit.      Coordination: Coordination normal.       Procedure     ECG 12 Lead    Date/Time: 8/28/2023 12:16 PM  Performed by: Marvin Mae MD  Authorized by: Marvin Mae MD          EKG: Normal sinus rhythm otherwise unremarkable EKG set for sinus tachycardia heart rate of 101 bpm no previous EKGs available for comparison  Assessment & Plan     Diagnoses and all orders for this visit:    1. Shortness of breath (Primary)  -     Adult Transthoracic Echo Complete w/ Color, Spectral and Contrast if necessary per protocol; Future  -     Comprehensive Metabolic Panel; Future    2. Bilateral leg edema  -     Adult Transthoracic Echo Complete w/ Color, Spectral and Contrast if necessary per protocol; Future  -     Comprehensive Metabolic Panel; Future    3. Dyslipidemia    4. Cigarette nicotine dependence in remission    5. Vaping nicotine dependence, tobacco product    Other orders  -     ECG 12 Lead    1.  She has quite significant symptoms currently is no fluid overloaded clinically we will go ahead and get an echocardiogram to assess her cardiac function wall motion and valve morphology  2.  Does not have any recent labs I will get CMP as well an lipids  3.  She had hyperlipidemia unfortunately she is not on any statin she has a family history of coronary artery disease I will try to get the lab results from her primary care physician office  4.  She quitted smoking 2 and half months ago now she is vaping she said she is trying to quit too    Return in about 4 weeks (around 9/25/2023).                 MEDS ORDERED DURING VISIT:  No orders of the defined types were placed in this encounter.      As always, Tomasa Islas, CHARISSA  I appreciate very much the opportunity to participate in the cardiovascular care of your patients. Please do not hesitate to call me with any questions with regards to Qing POOL  Greer evaluation and management.         This document has been electronically signed by Marvin Mae MD  August 28, 2023 13:15 EDT    This note is dictated utilizing voice recognition software.

## 2023-08-28 NOTE — PROGRESS NOTES
A [employer]OF[employer] message has been sent to the patient for patient rounding for [facility].

## 2023-08-28 NOTE — TELEPHONE ENCOUNTER
----- Message from Marvin Mae MD sent at 8/28/2023  1:14 PM EDT -----  Please obtain lipid profile from her family doctor's office

## 2023-08-29 ENCOUNTER — TRANSCRIBE ORDERS (OUTPATIENT)
Dept: ADMINISTRATIVE | Facility: HOSPITAL | Age: 33
End: 2023-08-29
Payer: COMMERCIAL

## 2023-08-29 DIAGNOSIS — I87.2 PERIPHERAL VENOUS INSUFFICIENCY: Primary | ICD-10-CM

## 2023-09-28 ENCOUNTER — TELEPHONE (OUTPATIENT)
Dept: CARDIOLOGY | Facility: CLINIC | Age: 33
End: 2023-09-28
Payer: COMMERCIAL

## 2023-09-28 ENCOUNTER — HOSPITAL ENCOUNTER (OUTPATIENT)
Dept: CARDIOLOGY | Facility: HOSPITAL | Age: 33
Discharge: HOME | End: 2023-09-28
Payer: COMMERCIAL

## 2023-09-28 DIAGNOSIS — R06.02 SHORTNESS OF BREATH: ICD-10-CM

## 2023-09-28 DIAGNOSIS — R60.0 BILATERAL LEG EDEMA: ICD-10-CM

## 2023-09-28 DIAGNOSIS — I87.2 PERIPHERAL VENOUS INSUFFICIENCY: ICD-10-CM

## 2023-09-28 LAB
BH CV ECHO MEAS - AO MAX PG: 8 MMHG
BH CV ECHO MEAS - AO MEAN PG: 4 MMHG
BH CV ECHO MEAS - AO ROOT DIAM: 2.6 CM
BH CV ECHO MEAS - AO V2 MAX: 141 CM/SEC
BH CV ECHO MEAS - AO V2 VTI: 25.1 CM
BH CV ECHO MEAS - EDV(CUBED): 42.9 ML
BH CV ECHO MEAS - EDV(MOD-SP4): 35.7 ML
BH CV ECHO MEAS - EF(MOD-SP4): 66.1 %
BH CV ECHO MEAS - ESV(CUBED): 22 ML
BH CV ECHO MEAS - ESV(MOD-SP4): 12.1 ML
BH CV ECHO MEAS - FS: 20 %
BH CV ECHO MEAS - IVS/LVPW: 1 CM
BH CV ECHO MEAS - IVSD: 1.1 CM
BH CV ECHO MEAS - LA DIMENSION: 2.8 CM
BH CV ECHO MEAS - LAT PEAK E' VEL: 13.7 CM/SEC
BH CV ECHO MEAS - LV DIASTOLIC VOL/BSA (35-75): 18.7 CM2
BH CV ECHO MEAS - LV MASS(C)D: 119 GRAMS
BH CV ECHO MEAS - LV SYSTOLIC VOL/BSA (12-30): 6.3 CM2
BH CV ECHO MEAS - LVIDD: 3.5 CM
BH CV ECHO MEAS - LVIDS: 2.8 CM
BH CV ECHO MEAS - LVOT AREA: 2.01 CM2
BH CV ECHO MEAS - LVOT DIAM: 1.6 CM
BH CV ECHO MEAS - LVPWD: 1.1 CM
BH CV ECHO MEAS - MED PEAK E' VEL: 11.4 CM/SEC
BH CV ECHO MEAS - MV A MAX VEL: 61.8 CM/SEC
BH CV ECHO MEAS - MV E MAX VEL: 84.2 CM/SEC
BH CV ECHO MEAS - MV E/A: 1.36
BH CV ECHO MEAS - PA ACC TIME: 0.15 SEC
BH CV ECHO MEAS - SI(MOD-SP4): 12.4 ML/M2
BH CV ECHO MEAS - SV(MOD-SP4): 23.6 ML
BH CV ECHO MEAS - TAPSE (>1.6): 2.5 CM
BH CV ECHO MEASUREMENTS AVERAGE E/E' RATIO: 6.71
LEFT ATRIUM VOLUME INDEX: 14.8 ML/M2

## 2023-09-28 PROCEDURE — 93970 EXTREMITY STUDY: CPT

## 2023-09-28 PROCEDURE — 93306 TTE W/DOPPLER COMPLETE: CPT

## 2023-09-28 NOTE — TELEPHONE ENCOUNTER
Caller: Qing Greer     Relationship: Self     Best call back number: [phone]     What orders are you requesting (i.e. lab or imaging): LABS     Where will you receive your lab/imaging services: [facility]/ [facility]    Additional notes: PTS WORK DOES NOT ALLOW HER MUCH TIME OFF, SHE IS WONDERING IF SHE CAN GO TO THE [facility] IN [place] AND GET THESE RESULTS SENT TO OUR OFFICE. PLEASE ADVISE PT.

## 2023-09-28 NOTE — TELEPHONE ENCOUNTER
HUB CAN READ  Called pt to inform them of labs needed for upcoming appointment on 10/12 she does not need to be fasting for these labs. They can be done at the hospital or the diagnostic center.

## 2023-10-12 ENCOUNTER — TELEPHONE (OUTPATIENT)
Dept: CARDIOLOGY | Facility: CLINIC | Age: 33
End: 2023-10-12

## 2023-10-12 ENCOUNTER — OFFICE VISIT (OUTPATIENT)
Dept: CARDIOLOGY | Facility: CLINIC | Age: 33
End: 2023-10-12
Payer: COMMERCIAL

## 2023-10-12 VITALS
HEART RATE: 83 BPM | SYSTOLIC BLOOD PRESSURE: 119 MMHG | BODY MASS INDEX: 35.44 KG/M2 | WEIGHT: 200 LBS | DIASTOLIC BLOOD PRESSURE: 70 MMHG | OXYGEN SATURATION: 98 % | HEIGHT: 63 IN

## 2023-10-12 DIAGNOSIS — R07.2 PRECORDIAL PAIN: Primary | ICD-10-CM

## 2023-10-12 DIAGNOSIS — E78.5 DYSLIPIDEMIA: ICD-10-CM

## 2023-10-12 PROCEDURE — 99214 OFFICE O/P EST MOD 30 MIN: CPT | Performed by: NURSE PRACTITIONER

## 2023-10-12 PROCEDURE — 1159F MED LIST DOCD IN RCRD: CPT | Performed by: NURSE PRACTITIONER

## 2023-10-12 PROCEDURE — 1160F RVW MEDS BY RX/DR IN RCRD: CPT | Performed by: NURSE PRACTITIONER

## 2023-10-12 RX ORDER — PRAZOSIN HYDROCHLORIDE 2 MG/1
2 CAPSULE ORAL NIGHTLY
COMMUNITY

## 2023-10-12 RX ORDER — PRAZOSIN HYDROCHLORIDE 1 MG/1
1 CAPSULE ORAL NIGHTLY
COMMUNITY

## 2023-10-12 NOTE — TELEPHONE ENCOUNTER
----- Message from CHARISSA Graves sent at 10/12/2023  9:15 AM EDT -----  Please request ER visit from [facility]

## 2023-10-12 NOTE — PROGRESS NOTES
"                [facility]             Yessi CHARISSA Taveras Bianca, PA  Qing Greer  1990  10/12/2023    Patient Active Problem List   Diagnosis    Iron deficiency anemia, unspecified    Acute cystitis without hematuria    Frequency of micturition    Detrusor instability of bladder    VU (stress urinary incontinence, female)    Stricture of female urethra    Shortness of breath    Bilateral leg edema    Dyslipidemia    Cigarette nicotine dependence in remission    Vaping nicotine dependence, tobacco product    Precordial pain       Dear Anna Marie Rojas PA:    Subjective     Chief Complaint   Patient presents with    Med Management    Results     Echo, went to ER Monday.        HPI:     This is a 33 y.o. female with known past medical history of dyslipidemia and tobacco abuse.    Qing Greer presents today for routine cardiology follow up.  Patient states she was recently in [place] ER on Monday for chest pain.  States she developed chest pressure and felt like a \"elephant was sitting on her chest \"and she could not breathe therefore she called an ambulance.  Chest pressure lasted about 4 hours and then went away after aspirin and nitroglycerin.  She states the ER told her her lab work was normal and she was discharged home.  I do not have records available today.  Has had no further chest pain since that episode.  Blood pressure stable.  Echocardiogram showed normal LV function.    Diagnostic Testing  Echocardiogram 9/2023: EF 66 to 70%     All other systems were reviewed and were negative.    Patient Active Problem List   Diagnosis    Iron deficiency anemia, unspecified    Acute cystitis without hematuria    Frequency of micturition    Detrusor instability of bladder    VU (stress urinary incontinence, female)    Stricture of female urethra    Shortness of breath    Bilateral leg edema    Dyslipidemia    Cigarette nicotine dependence in remission    Vaping " nicotine dependence, tobacco product    Precordial pain       family history includes Arrhythmia in her maternal grandmother and mother; Heart attack in her maternal grandmother; Hypertension in her maternal grandmother and mother.     reports that she has been smoking electronic cigarette. She has never used smokeless tobacco. She reports that she does not currently use alcohol after a past usage of about 1.0 standard drink of alcohol per week. She reports that she does not use drugs.    Allergies   Allergen Reactions    Macrobid [Nitrofurantoin] Itching    Norco [Hydrocodone-Acetaminophen] Hives and Itching         Current Outpatient Medications:     Baclofen (LIORESAL) 5 MG tablet, Take 1 tablet by mouth 3 (Three) Times a Day., Disp: , Rfl:     Cariprazine HCl (VRAYLAR) 3 MG capsule capsule, Take 1 capsule by mouth Daily., Disp: , Rfl:     cloNIDine (CATAPRES) 0.1 MG tablet, Take 1 tablet by mouth 2 (Two) Times a Day., Disp: , Rfl:     desloratadine (CLARINEX) 5 MG tablet, Take 1 tablet by mouth Daily., Disp: , Rfl:     divalproex (DEPAKOTE ER) 500 MG 24 hr tablet, 1 tablet 2 (Two) Times a Day., Disp: , Rfl:     Ergocalciferol (VITAMIN D2 PO), Take 50,000 Units by mouth 1 (One) Time Per Week. Monday nights., Disp: , Rfl:     metoprolol tartrate (LOPRESSOR) 25 MG tablet, Take 1 tablet by mouth 2 (Two) Times a Day., Disp: , Rfl:     omeprazole (priLOSEC) 20 MG capsule, Take 1 capsule by mouth Daily., Disp: , Rfl:     prazosin (MINIPRESS) 1 MG capsule, Take 1 capsule by mouth Every Night., Disp: , Rfl:     prazosin (MINIPRESS) 2 MG capsule, Take 1 capsule by mouth Every Night., Disp: , Rfl:       Physical Exam:  I have reviewed the patient's current vital signs as documented in the patient's EMR.   Vitals:    10/12/23 0908   BP: 119/70   Pulse: 83   SpO2: 98%     Body mass index is 35.43 kg/mý.       10/12/23  0908   Weight: 90.7 kg (200 lb)      Physical Exam  Constitutional:       General: She is not in acute  distress.     Appearance: Normal appearance. She is well-developed and normal weight.   HENT:      Head: Normocephalic and atraumatic.   Eyes:      General: Lids are normal.      Conjunctiva/sclera: Conjunctivae normal.      Pupils: Pupils are equal, round, and reactive to light.   Neck:      Vascular: No carotid bruit or JVD.   Cardiovascular:      Rate and Rhythm: Normal rate and regular rhythm.      Pulses: Normal pulses.      Heart sounds: Normal heart sounds, S1 normal and S2 normal. No murmur heard.  Pulmonary:      Effort: Pulmonary effort is normal. No respiratory distress.      Breath sounds: Normal breath sounds. No wheezing.   Abdominal:      General: Bowel sounds are normal. There is no distension.      Palpations: Abdomen is soft. There is no hepatomegaly or splenomegaly.      Tenderness: There is no abdominal tenderness.   Musculoskeletal:         General: No swelling. Normal range of motion.      Cervical back: Normal range of motion and neck supple.      Right lower leg: No edema.      Left lower leg: No edema.   Skin:     General: Skin is warm and dry.      Coloration: Skin is not jaundiced.      Findings: No rash.   Neurological:      General: No focal deficit present.      Mental Status: She is alert and oriented to person, place, and time. Mental status is at baseline.   Psychiatric:         Mood and Affect: Mood normal.         Speech: Speech normal.         Behavior: Behavior normal.         Thought Content: Thought content normal.         Judgment: Judgment normal.            DATA REVIEWED:     TTE/SAVANNAH:  Results for orders placed during the hospital encounter of 09/28/23    Adult Transthoracic Echo Complete w/ Color, Spectral and Contrast if necessary per protocol    Interpretation Summary    Left ventricular systolic function is normal. Left ventricular ejection fraction appears to be 66 - 70%.    Left ventricular diastolic function was normal.      Laboratory evaluations:    Lab Results  "  Component Value Date    GLUCOSE 98 08/03/2014    BUN 10 08/03/2014    CREATININE 0.6 08/03/2014    K 3.6 08/03/2014    CO2 22 08/03/2014    CALCIUM 9.7 08/03/2014    ALBUMIN 5.0 (H) 08/03/2014    AST 15 08/03/2014    ALT 13 08/03/2014     Lab Results   Component Value Date    WBC 8.60 08/03/2014    HGB 14.7 08/03/2014    HCT 43.7 08/03/2014    MCV 83.2 08/03/2014     08/03/2014     No results found for: \"CHOL\", \"CHLPL\", \"TRIG\", \"HDL\", \"LDL\", \"LDLDIRECT\"  No results found for: \"TSH\", \"C3GCLWR\", \"L8IXPAW\", \"THYROIDAB\"  No results found for: \"HGBA1C\"  Lab Results   Component Value Date    ALT 13 08/03/2014     No results found for: \"HGBA1C\"  Lab Results   Component Value Date    CREATININE 0.6 08/03/2014     No results found for: \"IRON\", \"TIBC\", \"FERRITIN\"  No results found for: \"INR\", \"PROTIME\"        --------------------------------------------------------------------------------------------------------------------------    ASSESSMENT/PLAN:      Diagnosis Plan   1. Precordial pain  Stress Test With Myocardial Perfusion One Day      2. Dyslipidemia            Chest pain  Patient with recent ED visit to [facility] for chest pain.  Records requested.  Echocardiogram showed normal LV function.  We will order treadmill nuclear stress testing to rule out any ischemia as patient does have history of tobacco abuse and dyslipidemia.  Advised her to check her blood pressure when she has these episodes of chest pain.    Dyslipidemia  Recently panel showed LDL 140s.  We discussed about diet and exercise.      This document has been @Electronically signed by CHARISSA Dimas, 10/12/23, 8:29 AM EDT.       Dictated Utilizing Dragon Dictation: Part of this note may be an electronic transcription/translation of spoken language to printed text using the Dragon Dictation System.    Follow-up appointment and medication changes provided in hand delivered After Visit Summary as well as reviewed in the room.     "

## 2023-10-31 ENCOUNTER — TELEPHONE (OUTPATIENT)
Dept: CARDIOLOGY | Facility: CLINIC | Age: 33
End: 2023-10-31
Payer: COMMERCIAL

## 2023-10-31 NOTE — TELEPHONE ENCOUNTER
HUB CAN READ  Called to let pt know that her Stress test was scheduled after her follow up. So we needed to move her follow up to after that has been completed moved to her to 12/1.  No answer left vm for call back.

## 2023-11-02 NOTE — TELEPHONE ENCOUNTER
HUB CAN READ  Called to let pt know that we needed to move her appointment to after her stress test was completed moved her to 12/1. No answer left vm for call back.

## 2023-11-03 NOTE — TELEPHONE ENCOUNTER
Spoke with pt and informed her that her stress test was scheduled after her follow up appointment and due to that we moved her follow up to 12/1 so she can have that completed before. She verbalized understanding.

## 2023-11-29 ENCOUNTER — TELEPHONE (OUTPATIENT)
Dept: CARDIOLOGY | Facility: CLINIC | Age: 33
End: 2023-11-29
Payer: COMMERCIAL

## 2023-11-29 NOTE — TELEPHONE ENCOUNTER
HUB TO RELAY  Called to let pt know that we needed to move her appointment to after that has been completed. Moved to 01/02 @ 9:15. No answer left vm for call back.

## 2024-11-27 ENCOUNTER — OFFICE VISIT (OUTPATIENT)
Dept: PSYCHIATRY | Facility: CLINIC | Age: 34
End: 2024-11-27
Payer: COMMERCIAL

## 2024-11-27 ENCOUNTER — LAB (OUTPATIENT)
Dept: FAMILY MEDICINE CLINIC | Facility: CLINIC | Age: 34
End: 2024-11-27
Payer: COMMERCIAL

## 2024-11-27 VITALS
DIASTOLIC BLOOD PRESSURE: 81 MMHG | HEIGHT: 63 IN | HEART RATE: 94 BPM | BODY MASS INDEX: 27.68 KG/M2 | OXYGEN SATURATION: 99 % | SYSTOLIC BLOOD PRESSURE: 126 MMHG | WEIGHT: 156.2 LBS

## 2024-11-27 DIAGNOSIS — Z79.899 HIGH RISK MEDICATION USE: ICD-10-CM

## 2024-11-27 DIAGNOSIS — F31.60 BIPOLAR AFFECTIVE DISORDER, MIXED: Primary | ICD-10-CM

## 2024-11-27 DIAGNOSIS — F43.10 POST TRAUMATIC STRESS DISORDER (PTSD): ICD-10-CM

## 2024-11-27 LAB
ANION GAP SERPL CALCULATED.3IONS-SCNC: 16.5 MMOL/L (ref 5–15)
B-HCG UR QL: NEGATIVE
BUN SERPL-MCNC: 7 MG/DL (ref 6–20)
BUN/CREAT SERPL: 8.1 (ref 7–25)
CALCIUM SPEC-SCNC: 9.9 MG/DL (ref 8.6–10.5)
CHLORIDE SERPL-SCNC: 98 MMOL/L (ref 98–107)
CO2 SERPL-SCNC: 26.5 MMOL/L (ref 22–29)
CREAT SERPL-MCNC: 0.86 MG/DL (ref 0.57–1)
EGFRCR SERPLBLD CKD-EPI 2021: 90.5 ML/MIN/1.73
EXPIRATION DATE: NORMAL
GLUCOSE SERPL-MCNC: 84 MG/DL (ref 65–99)
HCG SERPL QL: NEGATIVE
INTERNAL NEGATIVE CONTROL: NORMAL
INTERNAL POSITIVE CONTROL: NORMAL
LITHIUM SERPL-SCNC: <0.1 MMOL/L (ref 0.6–1.2)
Lab: NORMAL
POTASSIUM SERPL-SCNC: 3.3 MMOL/L (ref 3.5–5.2)
SODIUM SERPL-SCNC: 141 MMOL/L (ref 136–145)
T4 SERPL-MCNC: 8.84 MCG/DL (ref 4.5–11.7)
TSH SERPL DL<=0.05 MIU/L-ACNC: 0.74 UIU/ML (ref 0.27–4.2)

## 2024-11-27 PROCEDURE — 81025 URINE PREGNANCY TEST: CPT

## 2024-11-27 PROCEDURE — 84436 ASSAY OF TOTAL THYROXINE: CPT

## 2024-11-27 PROCEDURE — 80178 ASSAY OF LITHIUM: CPT

## 2024-11-27 PROCEDURE — 84443 ASSAY THYROID STIM HORMONE: CPT

## 2024-11-27 PROCEDURE — 1159F MED LIST DOCD IN RCRD: CPT

## 2024-11-27 PROCEDURE — 80048 BASIC METABOLIC PNL TOTAL CA: CPT

## 2024-11-27 PROCEDURE — 90792 PSYCH DIAG EVAL W/MED SRVCS: CPT

## 2024-11-27 PROCEDURE — 84703 CHORIONIC GONADOTROPIN ASSAY: CPT

## 2024-11-27 PROCEDURE — 1160F RVW MEDS BY RX/DR IN RCRD: CPT

## 2024-11-27 RX ORDER — PROPRANOLOL HYDROCHLORIDE 120 MG/1
120 CAPSULE, EXTENDED RELEASE ORAL
COMMUNITY
Start: 2024-10-26

## 2024-11-27 RX ORDER — SUMATRIPTAN SUCCINATE 100 MG/1
1 TABLET ORAL DAILY
COMMUNITY
Start: 2024-11-05

## 2024-11-27 RX ORDER — PRAZOSIN HYDROCHLORIDE 1 MG/1
1 CAPSULE ORAL NIGHTLY
Qty: 30 CAPSULE | Refills: 1 | Status: SHIPPED | OUTPATIENT
Start: 2024-11-27 | End: 2024-12-04 | Stop reason: SDUPTHER

## 2024-11-27 RX ORDER — ZOLPIDEM TARTRATE 5 MG/1
1 TABLET ORAL DAILY
COMMUNITY
Start: 2024-09-09

## 2024-11-27 RX ORDER — LITHIUM CARBONATE 300 MG/1
300 TABLET, FILM COATED, EXTENDED RELEASE ORAL 2 TIMES DAILY
Qty: 28 TABLET | Refills: 0 | Status: SHIPPED | OUTPATIENT
Start: 2024-11-27 | End: 2024-12-04 | Stop reason: SDUPTHER

## 2024-11-27 RX ORDER — PRAZOSIN HYDROCHLORIDE 2 MG/1
2 CAPSULE ORAL NIGHTLY
Qty: 30 CAPSULE | Refills: 1 | Status: SHIPPED | OUTPATIENT
Start: 2024-11-27 | End: 2024-12-04 | Stop reason: SDUPTHER
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
## 2024-11-28 LAB — B-HCG UR QL: NEGATIVE

## 2024-12-04 ENCOUNTER — TELEMEDICINE (OUTPATIENT)
Dept: PSYCHIATRY | Facility: CLINIC | Age: 34
End: 2024-12-04
Payer: COMMERCIAL

## 2024-12-04 DIAGNOSIS — Z53.21 PATIENT LEFT WITHOUT BEING SEEN: Primary | ICD-10-CM

## 2024-12-04 DIAGNOSIS — F31.60 BIPOLAR AFFECTIVE DISORDER, MIXED: ICD-10-CM

## 2024-12-04 DIAGNOSIS — F43.10 POST TRAUMATIC STRESS DISORDER (PTSD): ICD-10-CM

## 2024-12-04 PROCEDURE — 1159F MED LIST DOCD IN RCRD: CPT

## 2024-12-04 PROCEDURE — 1160F RVW MEDS BY RX/DR IN RCRD: CPT

## 2024-12-04 PROCEDURE — 99024 POSTOP FOLLOW-UP VISIT: CPT

## 2024-12-04 RX ORDER — PRAZOSIN HYDROCHLORIDE 1 MG/1
1 CAPSULE ORAL NIGHTLY
Qty: 30 CAPSULE | Refills: 1 | Status: SHIPPED | OUTPATIENT
Start: 2024-12-04

## 2024-12-04 RX ORDER — PRAZOSIN HYDROCHLORIDE 2 MG/1
2 CAPSULE ORAL NIGHTLY
Qty: 30 CAPSULE | Refills: 1 | Status: SHIPPED | OUTPATIENT
Start: 2024-12-04

## 2024-12-04 RX ORDER — LITHIUM CARBONATE 300 MG/1
300 TABLET, FILM COATED, EXTENDED RELEASE ORAL 2 TIMES DAILY
Qty: 28 TABLET | Refills: 0 | Status: SHIPPED | OUTPATIENT
Start: 2024-12-04 | End: 2024-12-18

## 2024-12-04 NOTE — PROGRESS NOTES
Subjective   Qing Greer is a 34 y.o. female who is here today for follow up encounter.     “This provider is located at [facility], [address] [place] [address]. The provider identified herself as well as her credentials.   The Patient is at/in home address by herself/himself, using her/his phone to access video application via my chart.  The patient's condition being diagnosed/treated is appropriate for telemedicine. The patient gave consent to be seen remotely, and when consent is given they understand that the consent allows for patient identifiable information to be sent to a third party as needed.   They may refuse to be seen remotely at any time. The electronic data is encrypted and password protected, and the patient has been advised of the potential risks to privacy not withstanding such measures.”     Start: 1109  Conclusion: 1114    Chief Complaint:  bipolar disorder, sleep difficulty     HPI:  History of Present Illness    Patient reports that medication changes have not been implemented as patient has not picked up medications at this time.  Mood symptomology is unchanged.  Patient denies any SI or HI.  Denies AVH.  Denies any delusional thought content.  Denies self-harm.      Past Psych History: Patient reports that she has been previously diagnosed and treated for anxiety, PTSD, bipolar 2 disorder.  Previously seeking care at [facility].  Denies any inpatient psychiatric hospitalizations.  Denies a history of TBI.  1 seizure approximately 15 years ago, denies any epileptic diagnosis or reoccurrence.  Unsure of  exposure to illicit substance.      Previous Psych Meds: Patient is unsure of all meds in which she has tried.  She is able to recall Lamictal, Seroquel, Depakote.  She reports that all of those meds made her worse.  She reports that the current meds of Vraylar, prazosin.  She reports that both of these meds have helped.  Reports that she is  "taking Vraylar 4.5 mg.  She reports that she has tried 6 mg however was unable to tolerate side effects.    Substance Abuse: Patient denies any history of or present illicit substance use, EtOH.  Variable Caffeine intake.     Social History: Patient reports that she is born [place] to biological mom however now lives in [place]. She reports that the man whom she thought was her dad was not her dad. She reports that she found out back completing a 23 and me ancestry screening whom her real dad was, resided in [place], however past due to suicide before she had the chance to meet him.  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  She identifies that she was  from 17 until 27, now .  2 children: 1 boy age 16 and 1 daughter age 12.  Reports that she adopted a 14-year-old in which she helped take care of when she was little \"because she was pregnant and her mother did not want her.\"  She reports that her adopted granddaughter also lives with her whom is 3.  She reports that she was a high school graduate.  Some college.  Currently employed at a [employer].  Previous employment in the service industry.  Denies any incarcerations or pending litigation.  Denies any  service.      Family Psychiatric History:  family history includes Anxiety disorder in her mother; Arrhythmia in her maternal grandmother and mother; Depression in her mother; Heart attack in her maternal grandmother; Hypertension in her maternal grandmother and mother; Suicide Attempts in her father.    Medical/Surgical History:  Past Medical History:   Diagnosis Date    Abnormal Pap smear of cervix 2012    Anxiety     Bipolar disorder     Depression     PTSD (post-traumatic stress disorder)     Seizure     1 \"unexplained seizure\" about 13 years ago - per pt 8/8/23    STD (sexually transmitted disease) 2011    Chlamydia    Urinary incontinence Last 3 months    Urinary tract infection     Alot " "throughout my life     Past Surgical History:   Procedure Laterality Date    APPENDECTOMY  2018    LAPAROSCOPIC CHOLECYSTECTOMY      SKIN BIOPSY      \"pre-cancerous\" moles removed    TONSILLECTOMY      and adenoids       Allergies   Allergen Reactions    Macrobid [Nitrofurantoin] Itching    Norco [Hydrocodone-Acetaminophen] Hives and Itching           Current Medications:   Current Outpatient Medications   Medication Sig Dispense Refill    Cariprazine HCl (Vraylar) 4.5 MG capsule capsule Take 1 capsule by mouth Daily for 60 days. 30 capsule 1    lithium (Lithobid) 300 MG CR tablet Take 1 tablet by mouth 2 (Two) Times a Day for 14 days. 28 tablet 0    prazosin (MINIPRESS) 1 MG capsule Take 1 capsule by mouth Every Night. 30 capsule 1    prazosin (MINIPRESS) 2 MG capsule Take 1 capsule by mouth Every Night. 30 capsule 1    Baclofen (LIORESAL) 5 MG tablet Take 1 tablet by mouth 3 (Three) Times a Day.      desloratadine (CLARINEX) 5 MG tablet Take 1 tablet by mouth Daily.      Ergocalciferol (VITAMIN D2 PO) Take 50,000 Units by mouth 1 (One) Time Per Week. Monday nights.      omeprazole (priLOSEC) 20 MG capsule Take 1 capsule by mouth Daily.      propranolol LA (INDERAL LA) 120 MG 24 hr capsule Take 1 capsule by mouth every night at bedtime.      SUMAtriptan (IMITREX) 100 MG tablet Take 1 tablet by mouth Daily.      zolpidem (AMBIEN) 5 MG tablet Take 1 tablet by mouth Daily.       No current facility-administered medications for this visit.         Review of Systems   Constitutional:  Positive for activity change and appetite change.   HENT: Negative.     Eyes: Negative.    Respiratory: Negative.     Cardiovascular: Negative.    Gastrointestinal: Negative.    Endocrine: Negative.    Genitourinary: Negative.    Musculoskeletal: Negative.    Skin: Negative.    Allergic/Immunologic: Negative.    Neurological: Negative.    Hematological: Negative.    Psychiatric/Behavioral:  Positive for decreased concentration, dysphoric " mood and sleep disturbance. Negative for self-injury and suicidal ideas. The patient is nervous/anxious and is hyperactive.     denies HEENT, cardiovascular, respiratory, liver, renal, GI/, endocrine, neuro, DERM, hematology, immunology, musculoskeletal disorders.    Objective   Physical Exam  Constitutional:       Appearance: Normal appearance.   Neurological:      Mental Status: She is alert.   Psychiatric:         Attention and Perception: Attention normal.         Mood and Affect: Mood and affect normal.     There were no vitals taken for this visit.    Mental Status Exam:   Hygiene:   good  Cooperation:  Cooperative  Eye Contact:  Fair  Psychomotor Behavior:  Restless  Affect:   Labile  Hopelessness: Denies  Speech:  Pressured and Rapid  Thought Process:  Goal directed and Linear  Thought Content:  Normal and Mood congruent  Suicidal:  None  Homicidal:  None  Hallucinations:  None  Delusion:  None  Memory:  Intact  Orientation:  Person, Place, Time, and Situation  Reliability:  fair  Insight:  Fair  Judgement:  Fair  Impulse Control:  Fair  Physical/Medical Issues: See history      Short-term goals: Patient will be compliant with clinic appointments.  Patient will be engaged in therapy, medication compliant with minimal side effects. Patient  will report decrease of symptoms and frequency.    Long-term goals: Patient will have minimal symptoms of  with continued medication management. Patient will be compliant with treatment and appointments.     Strengths: Motivation for treatment, insight  Weaknesses: Coping skills, strained family dynamics    Prognosis: Guarded dependent on medication/follow up and treatment plan compliance.  Functional Status:moderate  impairment in areas of daily functioning.    Assessment & Plan   Diagnoses and all orders for this visit:    1. Bipolar affective disorder, mixed  -     Cariprazine HCl (Vraylar) 4.5 MG capsule capsule; Take 1 capsule by mouth Daily for 60 days.  Dispense:  30 capsule; Refill: 1  -     lithium (Lithobid) 300 MG CR tablet; Take 1 tablet by mouth 2 (Two) Times a Day for 14 days.  Dispense: 28 tablet; Refill: 0  -     prazosin (MINIPRESS) 1 MG capsule; Take 1 capsule by mouth Every Night.  Dispense: 30 capsule; Refill: 1  -     prazosin (MINIPRESS) 2 MG capsule; Take 1 capsule by mouth Every Night.  Dispense: 30 capsule; Refill: 1    2. Post traumatic stress disorder (PTSD)  -     Cariprazine HCl (Vraylar) 4.5 MG capsule capsule; Take 1 capsule by mouth Daily for 60 days.  Dispense: 30 capsule; Refill: 1  -     lithium (Lithobid) 300 MG CR tablet; Take 1 tablet by mouth 2 (Two) Times a Day for 14 days.  Dispense: 28 tablet; Refill: 0  -     prazosin (MINIPRESS) 1 MG capsule; Take 1 capsule by mouth Every Night.  Dispense: 30 capsule; Refill: 1  -     prazosin (MINIPRESS) 2 MG capsule; Take 1 capsule by mouth Every Night.  Dispense: 30 capsule; Refill: 1          -Labs reviewed nonconcerning  -Labs to be collected in 1 week: BMP, TSH, T4, hCG, lithium level  -Continue prazosin 3 mg p.o. nightly for nightmares  -Continue Vraylar 4.5 mg p.o. daily for mood stabilization  -Start lithium 300 mg CR p.o. twice daily for her mixed presentation  -LIZET completed to allow for records from PCP as well as verbal communication: Pending.  -LIZET completed to allow for records from previous provider: Pending.  -Recommend psychotherapy  -Medications submitted to pharmacy at this time        Discussed medication and psychotherapy options.  I do not feel that it is justifiable to charge for today's visit as treatment changes have not started and symptomology has been consistent.  Plan will be to follow up in 1 week.  Discussed the risks, benefits, and side effects of the medication; client acknowledged and verbally consented.  Patient is aware to contact the [facility] with any worsening of symptom.  Patient is agreeable to go to the ER or call 911 should they begin SI/HI.      This APRN  has reviewed Lithium Toxicity symptoms with the patient including but not limited to: nausea, fine hand tremors, increased urination and thirst, weight gain, impaired thyroid functioning, fatigue, mental cloudiness, headaches, coarse hand tremors with toxicity, nystagmus, maculopapular rash, pruritis, acne, GI or stomach upset, diarrhea, vomiting, cramps, anorexia, diabetes insipidus, edema, microscopic tubular changes, t-wave inversion or abnormal cardiac rhythm, dysrhythmias, and leukocytosis.  The patient is advised if they experience any of these symptoms they are to contact this APRN/this office immediately or go to the Emergency Department immediately.  The patient verbalized understanding and agreement in their own words.  The patient is advised that taking Lithium with NSAID's, diuretics, ACE inhibitors, metronidazole, acetazolamide, methyldopa, carbamazepine, phenytoin, calcium channel blockers, and haloperidol may cause serious medication reactions including potentially fatal lithium toxicity.  The use of an SSRI with Lithium may raise the risk of dizziness, confusion, diarrhea, agitation, and tremor.  The patient is advised to avoid these medications, or if they are taking or plan to start any of these medications, this APRN/or a Provider at this office, and the patient's PCP/or the Prescriber of the medication should be notified/aware so further testing and monitoring can be performed.  The patient is advised of the need for hydration during treatment with Lithium, and the risks of not staying hydrated - drinking water.  The patient verbalizes understanding and agreement of instructions in their own words.  Also, the patient is instructed to complete the ordered lithium level after taking the Lithium  for at least 14 days with no missed doses.  The patient is instructed not to take the lithium on the morning of lab draw (as levels should be drawn about 12 hours after the last dose taken), but to take the  Lithium after the blood work/lab draw has been completed as taking it before having the blood/lab drawn will interfere with the results.  The patient verbalized understanding and agreement in their own words.    Return in about 1 week (around 12/11/2024).        This document has been electronically signed by CHARISSA Rojas   December 5, 2024 13:33 EST     Errors in dictation may reflect use of voice recognition software and not all errors in transcription may have been detected prior to signing.

## 2025-02-23 NOTE — TELEPHONE ENCOUNTER
HCC coding opportunities       Chart reviewed, no opportunity found: CHART REVIEWED, NO OPPORTUNITY FOUND        Patients Insurance     Medicare Insurance: [employer] Medicare Advantage           Per PCP-last lipid was 07/2023-they will send it

## 2025-05-07 ENCOUNTER — HOSPITAL ENCOUNTER (EMERGENCY)
Facility: HOSPITAL | Age: 35
Discharge: LEFT BEFORE DISCHARGE | End: 2025-05-07
Attending: STUDENT IN AN ORGANIZED HEALTH CARE EDUCATION/TRAINING PROGRAM
Payer: COMMERCIAL

## 2025-05-07 ENCOUNTER — APPOINTMENT (OUTPATIENT)
Dept: CT IMAGING | Facility: HOSPITAL | Age: 35
End: 2025-05-07
Payer: COMMERCIAL

## 2025-05-07 ENCOUNTER — TRANSCRIBE ORDERS (OUTPATIENT)
Dept: ADMINISTRATIVE | Facility: HOSPITAL | Age: 35
End: 2025-05-07
Payer: COMMERCIAL

## 2025-05-07 VITALS
DIASTOLIC BLOOD PRESSURE: 75 MMHG | RESPIRATION RATE: 16 BRPM | HEART RATE: 57 BPM | TEMPERATURE: 97.8 F | WEIGHT: 160.6 LBS | OXYGEN SATURATION: 98 % | SYSTOLIC BLOOD PRESSURE: 166 MMHG | HEIGHT: 63 IN | BODY MASS INDEX: 28.46 KG/M2

## 2025-05-07 DIAGNOSIS — R10.30 LOWER ABDOMINAL PAIN: Primary | ICD-10-CM

## 2025-05-07 DIAGNOSIS — R10.2 PELVIC PAIN SYNDROME: ICD-10-CM

## 2025-05-07 LAB
ALBUMIN SERPL-MCNC: 4.5 G/DL (ref 3.5–5.2)
ALBUMIN/GLOB SERPL: 1.7 G/DL
ALP SERPL-CCNC: 65 U/L (ref 39–117)
ALT SERPL W P-5'-P-CCNC: 9 U/L (ref 1–33)
ANION GAP SERPL CALCULATED.3IONS-SCNC: 10.8 MMOL/L (ref 5–15)
AST SERPL-CCNC: 10 U/L (ref 1–32)
B-HCG UR QL: NEGATIVE
BACTERIA UR QL AUTO: ABNORMAL /HPF
BASOPHILS # BLD AUTO: 0.06 10*3/MM3 (ref 0–0.2)
BASOPHILS NFR BLD AUTO: 0.5 % (ref 0–1.5)
BILIRUB SERPL-MCNC: 0.7 MG/DL (ref 0–1.2)
BILIRUB UR QL STRIP: NEGATIVE
BUN SERPL-MCNC: 8 MG/DL (ref 6–20)
BUN/CREAT SERPL: 7.9 (ref 7–25)
CALCIUM SPEC-SCNC: 9.2 MG/DL (ref 8.6–10.5)
CHLORIDE SERPL-SCNC: 103 MMOL/L (ref 98–107)
CLARITY UR: CLEAR
CO2 SERPL-SCNC: 27.2 MMOL/L (ref 22–29)
COLOR UR: YELLOW
CREAT SERPL-MCNC: 1.01 MG/DL (ref 0.57–1)
CRP SERPL-MCNC: <0.3 MG/DL (ref 0–0.5)
DEPRECATED RDW RBC AUTO: 42.3 FL (ref 37–54)
EGFRCR SERPLBLD CKD-EPI 2021: 74.6 ML/MIN/1.73
EOSINOPHIL # BLD AUTO: 0.16 10*3/MM3 (ref 0–0.4)
EOSINOPHIL NFR BLD AUTO: 1.2 % (ref 0.3–6.2)
ERYTHROCYTE [DISTWIDTH] IN BLOOD BY AUTOMATED COUNT: 13.1 % (ref 12.3–15.4)
GLOBULIN UR ELPH-MCNC: 2.6 GM/DL
GLUCOSE SERPL-MCNC: 95 MG/DL (ref 65–99)
GLUCOSE UR STRIP-MCNC: NEGATIVE MG/DL
HCT VFR BLD AUTO: 44.7 % (ref 34–46.6)
HGB BLD-MCNC: 14.6 G/DL (ref 12–15.9)
HGB UR QL STRIP.AUTO: NEGATIVE
HOLD SPECIMEN: NORMAL
HOLD SPECIMEN: NORMAL
HYALINE CASTS UR QL AUTO: ABNORMAL /LPF
IMM GRANULOCYTES # BLD AUTO: 0.09 10*3/MM3 (ref 0–0.05)
IMM GRANULOCYTES NFR BLD AUTO: 0.7 % (ref 0–0.5)
KETONES UR QL STRIP: ABNORMAL
LEUKOCYTE ESTERASE UR QL STRIP.AUTO: ABNORMAL
LIPASE SERPL-CCNC: 23 U/L (ref 13–60)
LYMPHOCYTES # BLD AUTO: 4.27 10*3/MM3 (ref 0.7–3.1)
LYMPHOCYTES NFR BLD AUTO: 33.1 % (ref 19.6–45.3)
MCH RBC QN AUTO: 28.6 PG (ref 26.6–33)
MCHC RBC AUTO-ENTMCNC: 32.7 G/DL (ref 31.5–35.7)
MCV RBC AUTO: 87.5 FL (ref 79–97)
MONOCYTES # BLD AUTO: 0.82 10*3/MM3 (ref 0.1–0.9)
MONOCYTES NFR BLD AUTO: 6.4 % (ref 5–12)
NEUTROPHILS NFR BLD AUTO: 58.1 % (ref 42.7–76)
NEUTROPHILS NFR BLD AUTO: 7.5 10*3/MM3 (ref 1.7–7)
NITRITE UR QL STRIP: NEGATIVE
NRBC BLD AUTO-RTO: 0 /100 WBC (ref 0–0.2)
PH UR STRIP.AUTO: 6 [PH] (ref 5–8)
PLATELET # BLD AUTO: 236 10*3/MM3 (ref 140–450)
PMV BLD AUTO: 10.1 FL (ref 6–12)
POTASSIUM SERPL-SCNC: 3.6 MMOL/L (ref 3.5–5.2)
PROT SERPL-MCNC: 7.1 G/DL (ref 6–8.5)
PROT UR QL STRIP: NEGATIVE
RBC # BLD AUTO: 5.11 10*6/MM3 (ref 3.77–5.28)
RBC # UR STRIP: ABNORMAL /HPF
RBC MORPH BLD: NORMAL
REF LAB TEST METHOD: ABNORMAL
SMALL PLATELETS BLD QL SMEAR: ADEQUATE
SODIUM SERPL-SCNC: 141 MMOL/L (ref 136–145)
SP GR UR STRIP: 1.02 (ref 1–1.03)
SQUAMOUS #/AREA URNS HPF: ABNORMAL /HPF
UROBILINOGEN UR QL STRIP: ABNORMAL
WBC # UR STRIP: ABNORMAL /HPF
WBC NRBC COR # BLD AUTO: 12.9 10*3/MM3 (ref 3.4–10.8)
WHOLE BLOOD HOLD COAG: NORMAL
WHOLE BLOOD HOLD SPECIMEN: NORMAL

## 2025-05-07 PROCEDURE — 85007 BL SMEAR W/DIFF WBC COUNT: CPT | Performed by: PHYSICIAN ASSISTANT

## 2025-05-07 PROCEDURE — 85025 COMPLETE CBC W/AUTO DIFF WBC: CPT | Performed by: PHYSICIAN ASSISTANT

## 2025-05-07 PROCEDURE — 80053 COMPREHEN METABOLIC PANEL: CPT | Performed by: PHYSICIAN ASSISTANT

## 2025-05-07 PROCEDURE — 99211 OFF/OP EST MAY X REQ PHY/QHP: CPT

## 2025-05-07 PROCEDURE — 25810000003 SODIUM CHLORIDE 0.9 % SOLUTION: Performed by: PHYSICIAN ASSISTANT

## 2025-05-07 PROCEDURE — 81001 URINALYSIS AUTO W/SCOPE: CPT | Performed by: PHYSICIAN ASSISTANT

## 2025-05-07 PROCEDURE — 83690 ASSAY OF LIPASE: CPT | Performed by: PHYSICIAN ASSISTANT

## 2025-05-07 PROCEDURE — 86140 C-REACTIVE PROTEIN: CPT | Performed by: PHYSICIAN ASSISTANT

## 2025-05-07 PROCEDURE — 81025 URINE PREGNANCY TEST: CPT | Performed by: PHYSICIAN ASSISTANT

## 2025-05-07 RX ORDER — SODIUM CHLORIDE 0.9 % (FLUSH) 0.9 %
10 SYRINGE (ML) INJECTION AS NEEDED
Status: DISCONTINUED | OUTPATIENT
Start: 2025-05-07 | End: 2025-05-07 | Stop reason: HOSPADM

## 2025-05-07 RX ADMIN — SODIUM CHLORIDE 1000 ML: 9 INJECTION, SOLUTION INTRAVENOUS at 20:18

## 2025-05-07 NOTE — ED PROVIDER NOTES
"Subjective     History provided by:  Patient   used: No        Review of Systems    Past Medical History:   Diagnosis Date    Abnormal Pap smear of cervix 2012    Anxiety     Bipolar disorder     Depression     PTSD (post-traumatic stress disorder)     Seizure     1 \"unexplained seizure\" about 13 years ago - per pt 8/8/23    STD (sexually transmitted disease) 2011    Chlamydia    Urinary incontinence Last 3 months    Urinary tract infection     Alot throughout my life       Allergies   Allergen Reactions    Macrobid [Nitrofurantoin] Itching    Norco [Hydrocodone-Acetaminophen] Hives and Itching       Past Surgical History:   Procedure Laterality Date    APPENDECTOMY  2018    LAPAROSCOPIC CHOLECYSTECTOMY      SKIN BIOPSY      \"pre-cancerous\" moles removed    TONSILLECTOMY      and adenoids       Family History   Problem Relation Age of Onset    Depression Mother     Anxiety disorder Mother     Arrhythmia Mother     Hypertension Mother     Suicide Attempts Father     Heart attack Maternal Grandmother     Arrhythmia Maternal Grandmother     Hypertension Maternal Grandmother        Social History     Socioeconomic History    Marital status: Single   Tobacco Use    Smoking status: Every Day     Types: Electronic Cigarette    Smokeless tobacco: Never   Vaping Use    Vaping status: Every Day    Substances: Nicotine, Flavoring   Substance and Sexual Activity    Alcohol use: Not Currently     Alcohol/week: 1.0 standard drink of alcohol     Types: 1 Glasses of wine per week    Drug use: Never    Sexual activity: Defer     Partners: Male           Objective   Physical Exam    Procedures           ED Course                                                       Medical Decision Making  Amount and/or Complexity of Data Reviewed  Labs: ordered.  Radiology: ordered.    Risk  Prescription drug management.        Final diagnoses:   None       ED Disposition  ED Disposition       None            No follow-up " provider specified.       Medication List      No changes were made to your prescriptions during this visit.

## 2025-05-08 NOTE — ED NOTES
Pt states that I had been rude to her and asked me to remove her iv so she could leave lead edy matos notified pt aox4 and able to fully understand risk for leavening.

## 2025-05-19 ENCOUNTER — HOSPITAL ENCOUNTER (OUTPATIENT)
Facility: HOSPITAL | Age: 35
Discharge: HOME | End: 2025-05-19
Admitting: STUDENT IN AN ORGANIZED HEALTH CARE EDUCATION/TRAINING PROGRAM
Payer: COMMERCIAL

## 2025-05-19 DIAGNOSIS — R10.2 PELVIC PAIN SYNDROME: ICD-10-CM

## 2025-05-19 DIAGNOSIS — R10.30 LOWER ABDOMINAL PAIN: ICD-10-CM

## 2025-05-19 PROCEDURE — 76830 TRANSVAGINAL US NON-OB: CPT | Performed by: RADIOLOGY

## 2025-05-19 PROCEDURE — 76830 TRANSVAGINAL US NON-OB: CPT

## 2025-07-10 ENCOUNTER — TRANSCRIBE ORDERS (OUTPATIENT)
Dept: ADMINISTRATIVE | Facility: HOSPITAL | Age: 35
End: 2025-07-10
Payer: COMMERCIAL

## 2025-07-10 ENCOUNTER — LAB (OUTPATIENT)
Dept: LAB | Facility: HOSPITAL | Age: 35
End: 2025-07-10
Payer: COMMERCIAL

## 2025-07-10 DIAGNOSIS — D72.829 LEUKOCYTOSIS, UNSPECIFIED TYPE: ICD-10-CM

## 2025-07-10 DIAGNOSIS — D72.829 LEUKOCYTOSIS, UNSPECIFIED TYPE: Primary | ICD-10-CM

## 2025-07-14 LAB — REF LAB TEST METHOD: NORMAL

## 2025-07-29 ENCOUNTER — HOSPITAL ENCOUNTER (EMERGENCY)
Facility: HOSPITAL | Age: 35
Discharge: PSYCHIATRIC OR SUBSTANCE USE FACILITY | DRG: 885 | End: 2025-07-30
Payer: COMMERCIAL

## 2025-07-29 VITALS
WEIGHT: 130 LBS | BODY MASS INDEX: 23.04 KG/M2 | SYSTOLIC BLOOD PRESSURE: 118 MMHG | TEMPERATURE: 97.8 F | RESPIRATION RATE: 18 BRPM | OXYGEN SATURATION: 96 % | HEIGHT: 63 IN | HEART RATE: 88 BPM | DIASTOLIC BLOOD PRESSURE: 76 MMHG

## 2025-07-29 DIAGNOSIS — R45.851 SUICIDAL IDEATION: Primary | ICD-10-CM

## 2025-07-29 LAB — HCG SERPL QL: NEGATIVE

## 2025-07-29 PROCEDURE — 80307 DRUG TEST PRSMV CHEM ANLYZR: CPT

## 2025-07-29 PROCEDURE — 85007 BL SMEAR W/DIFF WBC COUNT: CPT

## 2025-07-29 PROCEDURE — 84703 CHORIONIC GONADOTROPIN ASSAY: CPT

## 2025-07-29 PROCEDURE — 99285 EMERGENCY DEPT VISIT HI MDM: CPT

## 2025-07-29 PROCEDURE — 81001 URINALYSIS AUTO W/SCOPE: CPT

## 2025-07-29 PROCEDURE — 82077 ASSAY SPEC XCP UR&BREATH IA: CPT

## 2025-07-29 PROCEDURE — 36415 COLL VENOUS BLD VENIPUNCTURE: CPT

## 2025-07-29 PROCEDURE — 85025 COMPLETE CBC W/AUTO DIFF WBC: CPT

## 2025-07-29 PROCEDURE — 80053 COMPREHEN METABOLIC PANEL: CPT

## 2025-07-29 PROCEDURE — 83735 ASSAY OF MAGNESIUM: CPT

## 2025-07-30 ENCOUNTER — HOSPITAL ENCOUNTER (INPATIENT)
Facility: HOSPITAL | Age: 35
LOS: 1 days | Discharge: HOME | End: 2025-07-31
Attending: PSYCHIATRY & NEUROLOGY | Admitting: PSYCHIATRY & NEUROLOGY
Payer: COMMERCIAL

## 2025-07-30 PROBLEM — F43.10 PTSD (POST-TRAUMATIC STRESS DISORDER): Status: ACTIVE | Noted: 2025-07-30

## 2025-07-30 PROBLEM — F12.20 TETRAHYDROCANNABINOL (THC) USE DISORDER, MODERATE, DEPENDENCE: Status: ACTIVE | Noted: 2025-07-30

## 2025-07-30 PROBLEM — F17.200 NICOTINE USE DISORDER: Status: ACTIVE | Noted: 2023-08-28

## 2025-07-30 PROBLEM — R45.851 SUICIDAL IDEATIONS: Status: ACTIVE | Noted: 2025-07-30

## 2025-07-30 PROBLEM — R45.89 SUICIDAL BEHAVIOR: Status: ACTIVE | Noted: 2025-07-30

## 2025-07-30 PROBLEM — F31.30 BIPOLAR AFFECTIVE DISORDER, DEPRESSED: Status: ACTIVE | Noted: 2025-07-30

## 2025-07-30 LAB
ALBUMIN SERPL-MCNC: 4.4 G/DL (ref 3.5–5.2)
ALBUMIN SERPL-MCNC: 4.6 G/DL (ref 3.5–5.2)
ALBUMIN/GLOB SERPL: 1.8 G/DL
ALBUMIN/GLOB SERPL: 2.2 G/DL
ALP SERPL-CCNC: 67 U/L (ref 39–117)
ALP SERPL-CCNC: 68 U/L (ref 39–117)
ALT SERPL W P-5'-P-CCNC: 11 U/L (ref 1–33)
ALT SERPL W P-5'-P-CCNC: 12 U/L (ref 1–33)
AMPHET+METHAMPHET UR QL: NEGATIVE
AMPHETAMINES UR QL: NEGATIVE
ANION GAP SERPL CALCULATED.3IONS-SCNC: 14 MMOL/L (ref 5–15)
ANION GAP SERPL CALCULATED.3IONS-SCNC: 14 MMOL/L (ref 5–15)
AST SERPL-CCNC: 11 U/L (ref 1–32)
AST SERPL-CCNC: 14 U/L (ref 1–32)
BACTERIA UR QL AUTO: ABNORMAL /HPF
BARBITURATES UR QL SCN: NEGATIVE
BASOPHILS # BLD AUTO: 0.08 10*3/MM3 (ref 0–0.2)
BASOPHILS NFR BLD AUTO: 0.5 % (ref 0–1.5)
BENZODIAZ UR QL SCN: NEGATIVE
BILIRUB SERPL-MCNC: 1.5 MG/DL (ref 0–1.2)
BILIRUB SERPL-MCNC: 1.7 MG/DL (ref 0–1.2)
BILIRUB UR QL STRIP: NEGATIVE
BUN SERPL-MCNC: 7.6 MG/DL (ref 6–20)
BUN SERPL-MCNC: 7.7 MG/DL (ref 6–20)
BUN/CREAT SERPL: 11.3 (ref 7–25)
BUN/CREAT SERPL: 12 (ref 7–25)
BUPRENORPHINE SERPL-MCNC: NEGATIVE NG/ML
CALCIUM SPEC-SCNC: 8.6 MG/DL (ref 8.6–10.5)
CALCIUM SPEC-SCNC: 8.9 MG/DL (ref 8.6–10.5)
CANNABINOIDS SERPL QL: POSITIVE
CHLORIDE SERPL-SCNC: 101 MMOL/L (ref 98–107)
CHLORIDE SERPL-SCNC: 102 MMOL/L (ref 98–107)
CLARITY UR: CLEAR
CO2 SERPL-SCNC: 22 MMOL/L (ref 22–29)
CO2 SERPL-SCNC: 25 MMOL/L (ref 22–29)
COCAINE UR QL: NEGATIVE
COLOR UR: YELLOW
CREAT SERPL-MCNC: 0.64 MG/DL (ref 0.57–1)
CREAT SERPL-MCNC: 0.67 MG/DL (ref 0.57–1)
DEPRECATED RDW RBC AUTO: 43.3 FL (ref 37–54)
DEPRECATED RDW RBC AUTO: 43.8 FL (ref 37–54)
EGFRCR SERPLBLD CKD-EPI 2021: 116.3 ML/MIN/1.73
EGFRCR SERPLBLD CKD-EPI 2021: 117.6 ML/MIN/1.73
EOSINOPHIL # BLD AUTO: 0.15 10*3/MM3 (ref 0–0.4)
EOSINOPHIL # BLD MANUAL: 0.32 10*3/MM3 (ref 0–0.4)
EOSINOPHIL NFR BLD AUTO: 0.9 % (ref 0.3–6.2)
EOSINOPHIL NFR BLD MANUAL: 2 % (ref 0.3–6.2)
ERYTHROCYTE [DISTWIDTH] IN BLOOD BY AUTOMATED COUNT: 13.3 % (ref 12.3–15.4)
ERYTHROCYTE [DISTWIDTH] IN BLOOD BY AUTOMATED COUNT: 13.4 % (ref 12.3–15.4)
ETHANOL BLD-MCNC: <10 MG/DL (ref 0–10)
ETHANOL UR QL: <0.01 %
FENTANYL UR-MCNC: NEGATIVE NG/ML
GLOBULIN UR ELPH-MCNC: 2.1 GM/DL
GLOBULIN UR ELPH-MCNC: 2.4 GM/DL
GLUCOSE SERPL-MCNC: 89 MG/DL (ref 65–99)
GLUCOSE SERPL-MCNC: 98 MG/DL (ref 65–99)
GLUCOSE UR STRIP-MCNC: NEGATIVE MG/DL
HAV IGM SERPL QL IA: NORMAL
HBV CORE IGM SERPL QL IA: NORMAL
HBV SURFACE AG SERPL QL IA: NORMAL
HCT VFR BLD AUTO: 42.8 % (ref 34–46.6)
HCT VFR BLD AUTO: 43.3 % (ref 34–46.6)
HCV AB SER QL: NORMAL
HGB BLD-MCNC: 14.3 G/DL (ref 12–15.9)
HGB BLD-MCNC: 14.5 G/DL (ref 12–15.9)
HGB UR QL STRIP.AUTO: NEGATIVE
HYALINE CASTS UR QL AUTO: ABNORMAL /LPF
IMM GRANULOCYTES # BLD AUTO: 0.21 10*3/MM3 (ref 0–0.05)
IMM GRANULOCYTES NFR BLD AUTO: 1.3 % (ref 0–0.5)
KETONES UR QL STRIP: NEGATIVE
LEUKOCYTE ESTERASE UR QL STRIP.AUTO: NEGATIVE
LYMPHOCYTES # BLD AUTO: 3.56 10*3/MM3 (ref 0.7–3.1)
LYMPHOCYTES # BLD MANUAL: 4.11 10*3/MM3 (ref 0.7–3.1)
LYMPHOCYTES NFR BLD AUTO: 21.9 % (ref 19.6–45.3)
LYMPHOCYTES NFR BLD MANUAL: 5 % (ref 5–12)
MAGNESIUM SERPL-MCNC: 2.1 MG/DL (ref 1.6–2.6)
MCH RBC QN AUTO: 29.5 PG (ref 26.6–33)
MCH RBC QN AUTO: 30 PG (ref 26.6–33)
MCHC RBC AUTO-ENTMCNC: 33 G/DL (ref 31.5–35.7)
MCHC RBC AUTO-ENTMCNC: 33.9 G/DL (ref 31.5–35.7)
MCV RBC AUTO: 88.4 FL (ref 79–97)
MCV RBC AUTO: 89.3 FL (ref 79–97)
METAMYELOCYTES NFR BLD MANUAL: 1 % (ref 0–0)
METHADONE UR QL SCN: NEGATIVE
MONOCYTES # BLD AUTO: 1.03 10*3/MM3 (ref 0.1–0.9)
MONOCYTES # BLD: 0.79 10*3/MM3 (ref 0.1–0.9)
MONOCYTES NFR BLD AUTO: 6.3 % (ref 5–12)
NEUTROPHILS # BLD AUTO: 10.42 10*3/MM3 (ref 1.7–7)
NEUTROPHILS NFR BLD AUTO: 11.2 10*3/MM3 (ref 1.7–7)
NEUTROPHILS NFR BLD AUTO: 69.1 % (ref 42.7–76)
NEUTROPHILS NFR BLD MANUAL: 65 % (ref 42.7–76)
NEUTS BAND NFR BLD MANUAL: 1 % (ref 0–5)
NITRITE UR QL STRIP: NEGATIVE
NRBC BLD AUTO-RTO: 0 /100 WBC (ref 0–0.2)
OPIATES UR QL: NEGATIVE
OXYCODONE UR QL SCN: NEGATIVE
PCP UR QL SCN: NEGATIVE
PH UR STRIP.AUTO: 6 [PH] (ref 5–8)
PLAT MORPH BLD: NORMAL
PLATELET # BLD AUTO: 257 10*3/MM3 (ref 140–450)
PLATELET # BLD AUTO: 273 10*3/MM3 (ref 140–450)
PMV BLD AUTO: 9.5 FL (ref 6–12)
PMV BLD AUTO: 9.7 FL (ref 6–12)
POTASSIUM SERPL-SCNC: 3.2 MMOL/L (ref 3.5–5.2)
POTASSIUM SERPL-SCNC: 3.4 MMOL/L (ref 3.5–5.2)
PROT SERPL-MCNC: 6.7 G/DL (ref 6–8.5)
PROT SERPL-MCNC: 6.8 G/DL (ref 6–8.5)
PROT UR QL STRIP: ABNORMAL
QT INTERVAL: 416 MS
QTC INTERVAL: 436 MS
RBC # BLD AUTO: 4.84 10*6/MM3 (ref 3.77–5.28)
RBC # BLD AUTO: 4.85 10*6/MM3 (ref 3.77–5.28)
RBC # UR STRIP: ABNORMAL /HPF
RBC MORPH BLD: NORMAL
REF LAB TEST METHOD: ABNORMAL
SODIUM SERPL-SCNC: 137 MMOL/L (ref 136–145)
SODIUM SERPL-SCNC: 141 MMOL/L (ref 136–145)
SP GR UR STRIP: >=1.03 (ref 1–1.03)
SQUAMOUS #/AREA URNS HPF: ABNORMAL /HPF
TRICYCLICS UR QL SCN: NEGATIVE
UROBILINOGEN UR QL STRIP: ABNORMAL
VARIANT LYMPHS NFR BLD MANUAL: 26 % (ref 19.6–45.3)
WBC # UR STRIP: ABNORMAL /HPF
WBC NRBC COR # BLD AUTO: 15.79 10*3/MM3 (ref 3.4–10.8)
WBC NRBC COR # BLD AUTO: 16.23 10*3/MM3 (ref 3.4–10.8)
YEAST URNS QL MICRO: ABNORMAL /HPF

## 2025-07-30 PROCEDURE — 80053 COMPREHEN METABOLIC PANEL: CPT | Performed by: PSYCHIATRY & NEUROLOGY

## 2025-07-30 PROCEDURE — 93005 ELECTROCARDIOGRAM TRACING: CPT

## 2025-07-30 PROCEDURE — 80074 ACUTE HEPATITIS PANEL: CPT | Performed by: PSYCHIATRY & NEUROLOGY

## 2025-07-30 PROCEDURE — 93010 ELECTROCARDIOGRAM REPORT: CPT | Performed by: INTERNAL MEDICINE

## 2025-07-30 PROCEDURE — 99223 1ST HOSP IP/OBS HIGH 75: CPT | Performed by: PSYCHIATRY & NEUROLOGY

## 2025-07-30 PROCEDURE — 85025 COMPLETE CBC W/AUTO DIFF WBC: CPT | Performed by: PSYCHIATRY & NEUROLOGY

## 2025-07-30 RX ORDER — PROPRANOLOL HYDROCHLORIDE 60 MG/1
120 CAPSULE, EXTENDED RELEASE ORAL NIGHTLY
Status: DISCONTINUED | OUTPATIENT
Start: 2025-07-30 | End: 2025-07-31 | Stop reason: HOSPADM

## 2025-07-30 RX ORDER — ZOLPIDEM TARTRATE 5 MG/1
5 TABLET ORAL NIGHTLY PRN
Status: DISCONTINUED | OUTPATIENT
Start: 2025-07-30 | End: 2025-07-31 | Stop reason: HOSPADM

## 2025-07-30 RX ORDER — FAMOTIDINE 20 MG/1
20 TABLET, FILM COATED ORAL 2 TIMES DAILY PRN
Status: DISCONTINUED | OUTPATIENT
Start: 2025-07-30 | End: 2025-07-31 | Stop reason: HOSPADM

## 2025-07-30 RX ORDER — DICYCLOMINE HYDROCHLORIDE 10 MG/1
10 CAPSULE ORAL EVERY 6 HOURS PRN
COMMUNITY

## 2025-07-30 RX ORDER — PRAZOSIN HYDROCHLORIDE 1 MG/1
1 CAPSULE ORAL NIGHTLY
COMMUNITY

## 2025-07-30 RX ORDER — TRAZODONE HYDROCHLORIDE 50 MG/1
50 TABLET ORAL NIGHTLY PRN
Status: DISCONTINUED | OUTPATIENT
Start: 2025-07-30 | End: 2025-07-31 | Stop reason: HOSPADM

## 2025-07-30 RX ORDER — PANTOPRAZOLE SODIUM 40 MG/1
40 TABLET, DELAYED RELEASE ORAL NIGHTLY
Status: DISCONTINUED | OUTPATIENT
Start: 2025-07-30 | End: 2025-07-31 | Stop reason: HOSPADM

## 2025-07-30 RX ORDER — ECHINACEA PURPUREA EXTRACT 125 MG
2 TABLET ORAL
Status: DISCONTINUED | OUTPATIENT
Start: 2025-07-30 | End: 2025-07-31 | Stop reason: HOSPADM

## 2025-07-30 RX ORDER — ESOMEPRAZOLE MAGNESIUM 40 MG/1
40 CAPSULE, DELAYED RELEASE ORAL NIGHTLY
COMMUNITY

## 2025-07-30 RX ORDER — PRAZOSIN HYDROCHLORIDE 1 MG/1
2 CAPSULE ORAL NIGHTLY
Status: DISCONTINUED | OUTPATIENT
Start: 2025-07-30 | End: 2025-07-31 | Stop reason: HOSPADM

## 2025-07-30 RX ORDER — NICOTINE 21 MG/24HR
1 PATCH, TRANSDERMAL 24 HOURS TRANSDERMAL EVERY 24 HOURS
Status: DISCONTINUED | OUTPATIENT
Start: 2025-07-30 | End: 2025-07-31

## 2025-07-30 RX ORDER — PRAZOSIN HYDROCHLORIDE 2 MG/1
2 CAPSULE ORAL NIGHTLY
COMMUNITY

## 2025-07-30 RX ORDER — BENZTROPINE MESYLATE 1 MG/ML
1 INJECTION, SOLUTION INTRAMUSCULAR; INTRAVENOUS ONCE AS NEEDED
Status: DISCONTINUED | OUTPATIENT
Start: 2025-07-30 | End: 2025-07-31 | Stop reason: HOSPADM

## 2025-07-30 RX ORDER — POLYETHYLENE GLYCOL 3350 17 G/17G
17 POWDER, FOR SOLUTION ORAL DAILY PRN
Status: DISCONTINUED | OUTPATIENT
Start: 2025-07-30 | End: 2025-07-31 | Stop reason: HOSPADM

## 2025-07-30 RX ORDER — DICYCLOMINE HYDROCHLORIDE 10 MG/1
10 CAPSULE ORAL EVERY 6 HOURS PRN
Status: DISCONTINUED | OUTPATIENT
Start: 2025-07-30 | End: 2025-07-31 | Stop reason: HOSPADM

## 2025-07-30 RX ORDER — FAMOTIDINE 20 MG/1
40 TABLET, FILM COATED ORAL NIGHTLY
Status: DISCONTINUED | OUTPATIENT
Start: 2025-07-30 | End: 2025-07-31 | Stop reason: HOSPADM

## 2025-07-30 RX ORDER — ZOLPIDEM TARTRATE 5 MG/1
5 TABLET ORAL NIGHTLY PRN
COMMUNITY

## 2025-07-30 RX ORDER — BENZTROPINE MESYLATE 1 MG/1
2 TABLET ORAL ONCE AS NEEDED
Status: DISCONTINUED | OUTPATIENT
Start: 2025-07-30 | End: 2025-07-31 | Stop reason: HOSPADM

## 2025-07-30 RX ORDER — PROPRANOLOL HYDROCHLORIDE 120 MG/1
120 CAPSULE, EXTENDED RELEASE ORAL NIGHTLY
COMMUNITY

## 2025-07-30 RX ORDER — ACETAMINOPHEN 325 MG/1
650 TABLET ORAL EVERY 6 HOURS PRN
Status: DISCONTINUED | OUTPATIENT
Start: 2025-07-30 | End: 2025-07-30

## 2025-07-30 RX ORDER — BENZONATATE 100 MG/1
100 CAPSULE ORAL 3 TIMES DAILY PRN
Status: DISCONTINUED | OUTPATIENT
Start: 2025-07-30 | End: 2025-07-31 | Stop reason: HOSPADM

## 2025-07-30 RX ORDER — ARIPIPRAZOLE 10 MG/1
5 TABLET ORAL DAILY
Status: DISCONTINUED | OUTPATIENT
Start: 2025-07-30 | End: 2025-07-31 | Stop reason: HOSPADM

## 2025-07-30 RX ORDER — LOPERAMIDE HYDROCHLORIDE 2 MG/1
2 CAPSULE ORAL
Status: DISCONTINUED | OUTPATIENT
Start: 2025-07-30 | End: 2025-07-31 | Stop reason: HOSPADM

## 2025-07-30 RX ORDER — HYDROXYZINE HYDROCHLORIDE 50 MG/1
50 TABLET, FILM COATED ORAL EVERY 6 HOURS PRN
Status: DISCONTINUED | OUTPATIENT
Start: 2025-07-30 | End: 2025-07-31 | Stop reason: HOSPADM

## 2025-07-30 RX ORDER — PRAZOSIN HYDROCHLORIDE 1 MG/1
1 CAPSULE ORAL NIGHTLY
Status: DISCONTINUED | OUTPATIENT
Start: 2025-07-30 | End: 2025-07-31 | Stop reason: HOSPADM

## 2025-07-30 RX ORDER — CETIRIZINE HYDROCHLORIDE 10 MG/1
10 TABLET ORAL NIGHTLY
Status: DISCONTINUED | OUTPATIENT
Start: 2025-07-30 | End: 2025-07-31 | Stop reason: HOSPADM

## 2025-07-30 RX ORDER — LORATADINE 10 MG/1
10 TABLET ORAL NIGHTLY
COMMUNITY

## 2025-07-30 RX ORDER — IBUPROFEN 400 MG/1
400 TABLET, FILM COATED ORAL EVERY 6 HOURS PRN
Status: DISCONTINUED | OUTPATIENT
Start: 2025-07-30 | End: 2025-07-31 | Stop reason: HOSPADM

## 2025-07-30 RX ORDER — ALUMINA, MAGNESIA, AND SIMETHICONE 2400; 2400; 240 MG/30ML; MG/30ML; MG/30ML
15 SUSPENSION ORAL EVERY 6 HOURS PRN
Status: DISCONTINUED | OUTPATIENT
Start: 2025-07-30 | End: 2025-07-31 | Stop reason: HOSPADM

## 2025-07-30 RX ORDER — LANOLIN ALCOHOL/MO/W.PET/CERES
2000 CREAM (GRAM) TOPICAL NIGHTLY
Status: DISCONTINUED | OUTPATIENT
Start: 2025-07-30 | End: 2025-07-31 | Stop reason: HOSPADM

## 2025-07-30 RX ORDER — ONDANSETRON 4 MG/1
4 TABLET, ORALLY DISINTEGRATING ORAL EVERY 6 HOURS PRN
Status: DISCONTINUED | OUTPATIENT
Start: 2025-07-30 | End: 2025-07-31 | Stop reason: HOSPADM

## 2025-07-30 RX ORDER — FAMOTIDINE 40 MG/1
40 TABLET, FILM COATED ORAL NIGHTLY
COMMUNITY

## 2025-07-30 RX ADMIN — CETIRIZINE HYDROCHLORIDE 10 MG: 10 TABLET, FILM COATED ORAL at 20:27

## 2025-07-30 RX ADMIN — CARIPRAZINE 4.5 MG: 3 CAPSULE, GELATIN COATED ORAL at 04:27

## 2025-07-30 RX ADMIN — FAMOTIDINE 40 MG: 20 TABLET, FILM COATED ORAL at 04:26

## 2025-07-30 RX ADMIN — CARIPRAZINE 3 MG: 3 CAPSULE, GELATIN COATED ORAL at 20:27

## 2025-07-30 RX ADMIN — CETIRIZINE HYDROCHLORIDE 10 MG: 10 TABLET, FILM COATED ORAL at 04:26

## 2025-07-30 RX ADMIN — ARIPIPRAZOLE 5 MG: 10 TABLET ORAL at 10:51

## 2025-07-30 RX ADMIN — PANTOPRAZOLE SODIUM 40 MG: 40 TABLET, DELAYED RELEASE ORAL at 20:27

## 2025-07-30 RX ADMIN — PANTOPRAZOLE SODIUM 40 MG: 40 TABLET, DELAYED RELEASE ORAL at 04:26

## 2025-07-30 RX ADMIN — PROPRANOLOL HYDROCHLORIDE 120 MG: 60 CAPSULE, EXTENDED RELEASE ORAL at 04:27

## 2025-07-30 RX ADMIN — PROPRANOLOL HYDROCHLORIDE 120 MG: 60 CAPSULE, EXTENDED RELEASE ORAL at 20:26

## 2025-07-30 RX ADMIN — PRAZOSIN HYDROCHLORIDE 1 MG: 1 CAPSULE ORAL at 04:30

## 2025-07-30 RX ADMIN — PRAZOSIN HYDROCHLORIDE 2 MG: 1 CAPSULE ORAL at 20:28

## 2025-07-30 RX ADMIN — NICOTINE 1 PATCH: 21 PATCH, EXTENDED RELEASE TRANSDERMAL at 04:28

## 2025-07-30 RX ADMIN — FAMOTIDINE 40 MG: 20 TABLET, FILM COATED ORAL at 20:27

## 2025-07-30 RX ADMIN — PRAZOSIN HYDROCHLORIDE 2 MG: 1 CAPSULE ORAL at 04:30

## 2025-07-30 RX ADMIN — Medication 2000 MCG: at 20:26

## 2025-07-30 RX ADMIN — Medication 2000 MCG: at 04:28

## 2025-07-30 RX ADMIN — PRAZOSIN HYDROCHLORIDE 1 MG: 1 CAPSULE ORAL at 20:28

## 2025-07-30 NOTE — PLAN OF CARE
Goal Outcome Evaluation:           Progress: improving  Outcome Evaluation: Therapist met with patient to review care plan, social history, aftercare recommendation, and disposition plan.           Problem: Adult Behavioral Health Plan of Care  Goal: Plan of Care Review  Outcome: Progressing  Flowsheets  Taken 7/30/2025 1348 by Pravin Parker  Progress: improving  Outcome Evaluation: Therapist met with patient to review care plan, social history, aftercare recommendation, and disposition plan.  Taken 7/30/2025 1234 by Riri Bhagat RN  Patient Agreement with Plan of Care: agrees  Plan of Care Reviewed With: patient  Goal: Patient-Specific Goal (Individualization)  Outcome: Progressing  Flowsheets  Taken 7/30/2025 1348  Patient/Family-Specific Goals (Include Timeframe): Identify 2-3 coping skills, complete safety plan, complete aftercare plan, and deny SI/HI within 1-7 days.  Individualized Care Needs: Therapist to offer 1-4 therapy sessions, aftercare planning, safety planning, daily groups, and brief CBT/MI interventions.  Anxieties, Fears or Concerns: None stated  Taken 7/30/2025 1342  Patient Personal Strengths:   self-reliant   resilient   expressive of emotions   expressive of needs   motivated for treatment  Patient Vulnerabilities:   poor impulse control   substance abuse/addiction   lacks insight into illness   traumatic event  Goal: Optimized Coping Skills in Response to Life Stressors  Outcome: Progressing  Intervention: Promote Effective Coping Strategies  Flowsheets (Taken 7/30/2025 1348)  Supportive Measures:   active listening utilized   counseling provided   decision-making supported   positive reinforcement provided   mindfulness techniques promoted   self-reflection promoted   self-responsibility promoted   verbalization of feelings encouraged  Goal: Develops/Participates in [facility] to Support Successful Transition  Outcome: Progressing  Intervention: Foster [facility]rapeutic  [place]  Flowsheets (Taken 7/30/2025 1348)  Trust Relationship/Rapport:   care explained   choices provided   reassurance provided   thoughts/feelings acknowledged   emotional support provided   empathic listening provided   questions encouraged   questions answered  Intervention: Mutually Develop Transition Plan  Flowsheets  Taken 7/30/2025 1348 by Pravin Parker  Outpatient/Agency/Support Group Needs:   outpatient counseling   outpatient medication management  Discharge Coordination/Progress:   Therapist and patient met to complete discharge needs assessment   patient considering outpatient services.  Transition Support:   crisis management plan promoted   crisis management plan verbalized   follow-up care discussed  Anticipated Discharge Disposition: home with family  Transportation Concerns: no car  Concerns to be Addressed:   mental health   medication  Readmission Within the Last 30 Days: no previous admission in last 30 days  Taken 7/30/2025 0343 by Daly Rueda RN  Transportation Anticipated: car, drives self  Patient/Family Anticipated Services at Transition: mental health services  Patient/Family Anticipates Transition to: home with family     DATA: Therapist met individually with Patient this date for initial evaluation.  Introduced self as Therapist and the role of a positive therapeutic relationship; Patient agreeable.      Therapist encouraged Patient to speak openly and honestly about any issues or stressors during treatment stay. Therapist explained how open communication is significant to providing most effective care.      Therapist completed psychosocial assessment, integrated summary, reviewed care plans, disposition planning and discussed hospitalization expectations and treatment goals this date.     Therapist provided education regarding different levels of care and is recommending outpatient therapy and medication management for most appropriate aftercare. Patient agreeable. Patient signed  consent for [facility].     Therapist is recommending family involvement prior to discharge and it's importance. Patient agreeable and signed consent for her mother.     Therapist addressed discharge safety planning this date. Assisted patient in identifying risk factors which would indicate the need for higher level of care after discharge;  including thoughts to harm self or others and/or self-harming behavior. Encouraged patient to call 911, or present to the nearest emergency room should any of these events occur. Discussed crisis intervention services and means to access.  Encouraged securing any objects of harm.      Therapist completed safety planning with ____ who confirmed Patient does not have access to any firearms in the home. Recommended and highly encouraged locking up all medications, knives, razors and anything else Patient could potentially harm themselves or others with, ___ stated understanding.     CLINICAL MANUVERING/INTERVENTIONS:  Assisted Patient in processing session content; acknowledged and normalized Patient’s thoughts, feelings, and concerns by utilizing a person-centered approach in efforts to build appropriate rapport and a positive therapeutic relationship with open and honest communication. Allowed Patient to ventilate regarding current stressors and triggers for negative emotions and thoughts in a safe nonjudgmental environment with unconditional positive regard, active listening skills, and empathy.     Therapist assisted patient in processing session content; acknowledged and normalized patient’s thoughts, feelings, and concerns.  Discussed the therapist/patient relationship and explain the parameters and limitations of relative confidentiality.  Also discussed the importance of active participation, and honesty to the treatment process.  Encouraged the patient to discuss/vent their feelings, frustrations, and fears concerning their ongoing medical issues and validated  their feelings.     Discussed the importance of finding enjoyable activities and coping skills that the patient can engage in a regular basis. Discussed healthy coping skills such as distraction, self love, grounding, thought challenges/reframing, etc. Discussed the importance of medication compliance.  Praised the patient for seeking help and spent the majority of the session building rapport.       Allowed patient to freely discuss issues without interruption or judgment. Provided safe, confidential environment to facilitate the development of positive therapeutic relationship and encourage open and honest communication.      Therapist addressed discharge safety planning on this date. Assisted patient in identifying risk factors which would indicate the need for higher level of care after discharge; including thoughts to harm self or others and/or self-harming behavior. Encouraged patient to call 911, or present to the nearest emergency room should any of these events occur. Discussed crisis intervention services and means to access.  Encouraged securing any objects of harm.       Therapist completed integrated summary, treatment plan, and initiated social history this date.      ASSESSMENT: Patient is a 35 year old female who presented to the ED with SI. Patient reports that she is living in her home with her mother and her boyfriend. Patient has no prior admissions.     Patient seen 1:1 bedside. Patient reports that she has been struggling for a while with her depression and anxiety, which has led to thoughts of suicide. Patient states that her depression and anxiety have been heightened by the normal stressors of life and dealing with her 's suicide. Patient states that since admission she has started to see improvements in her anxiety and depression. Patient is hopeful that further medication adjustments will be helpful in feeling better. Patient denies SI/HI/AVH.     PLAN: Patient will receive 24/7  nursing monitoring and daily psychiatrist evaluation by a multidisciplinary team.    Patient will continue stabilization at this time.     Patient is agreeable for outpatient services with [facility].     Public assistance with transportation will not be needed. Family member will provide.

## 2025-07-30 NOTE — ED PROVIDER NOTES
"Subjective   History of Present Illness  Patient is a 35-year-old female presents today requesting a psychiatric evaluation.  She reports that the last 2 days she is been more depressed.  She reports that last night and today she is felt \"very hopeless.\"  She reports \"I just want to end it.  I do not want to live anymore.\"  She reports that she has been having thoughts of wanting to end her life but denies active plan.  She denies homicidal ideation or any hallucinations.  Denies drug or alcohol abuse.  Denies other complaints.        Review of Systems   Respiratory: Negative.     Cardiovascular: Negative.    Gastrointestinal: Negative.    Genitourinary: Negative.    Psychiatric/Behavioral:  Positive for suicidal ideas.        Past Medical History:   Diagnosis Date    Abnormal Pap smear of cervix 2012    Acid reflux     Anxiety     Bipolar disorder     Depression     PTSD (post-traumatic stress disorder)     STD (sexually transmitted disease) 2011    Chlamydia    Urinary incontinence Last 3 months    Urinary tract infection     Alot throughout my life       Allergies   Allergen Reactions    Macrobid [Nitrofurantoin] Itching    Norco [Hydrocodone-Acetaminophen] Hives and Itching       Past Surgical History:   Procedure Laterality Date    APPENDECTOMY  2018    LAPAROSCOPIC CHOLECYSTECTOMY      SKIN BIOPSY      \"pre-cancerous\" moles removed    TONSILLECTOMY      and adenoids       Family History   Problem Relation Age of Onset    Depression Mother     Anxiety disorder Mother     Arrhythmia Mother     Hypertension Mother     Suicide Attempts Father     Heart attack Maternal Grandmother     Arrhythmia Maternal Grandmother     Hypertension Maternal Grandmother        Social History     Socioeconomic History    Marital status: Single    Number of children: 3    Highest education level: Some college, no degree   Tobacco Use    Smoking status: Every Day     Current packs/day: 0.50     Average packs/day: 0.5 packs/day for 8.6 " "years (4.3 ttl pk-yrs)     Types: Cigarettes     Start date: 2017    Smokeless tobacco: Never   Vaping Use    Vaping status: Every Day    Substances: Nicotine, THC, CBD, Flavoring    Devices: Disposable   Substance and Sexual Activity    Alcohol use: Not Currently     Alcohol/week: 1.0 standard drink of alcohol     Types: 1 Glasses of wine per week    Drug use: Never    Sexual activity: Defer     Partners: Male           Objective   Physical Exam  Cardiovascular:      Rate and Rhythm: Normal rate and regular rhythm.      Pulses: Normal pulses.   Pulmonary:      Effort: Pulmonary effort is normal.   Neurological:      Mental Status: She is alert.   Psychiatric:         Attention and Perception: Attention normal.         Mood and Affect: Mood is depressed.         Thought Content: Thought content includes suicidal ideation. Thought content does not include suicidal plan.         Procedures           ED Course  ED Course as of 07/30/25 0244 Wed Jul 30, 2025   0205 Patient accepted to the [facility] for further evaluation per on-call psychiatrist. [KH]   0230 EKG interpretation normal sinus rhythm 66 bpm QTc is 436 no ST elevations or depressions.  Electronically signed by Vance Edwards DO, 07/30/25, 2:30 AM EDT.   [GF]      ED Course User Index  [GF] Vance Edwards DO  [KH] Rosario Mendosa, APRN                                                       Medical Decision Making  Patient is a 35-year-old female presents today requesting a psychiatric evaluation.  She reports that the last 2 days she is been more depressed.  She reports that last night and today she is felt \"very hopeless.\"  She reports \"I just want to end it.  I do not want to live anymore.\"  She reports that she has been having thoughts of wanting to end her life but denies active plan.  She denies homicidal ideation or any hallucinations.  Denies drug or alcohol abuse.  Denies other complaints.    Problems Addressed:  Suicidal " ideation: complicated acute illness or injury    Amount and/or Complexity of Data Reviewed  Labs: ordered.  ECG/medicine tests: ordered.        Final diagnoses:   Suicidal ideation       ED Disposition  ED Disposition       ED Disposition   DC/Transfer to Behavioral Health Condition   Stable    Comment   --               No follow-up provider specified.       Medication List      No changes were made to your prescriptions during this visit.            Rosario Mendsoa, APRN  07/30/25 0244

## 2025-07-30 NOTE — PLAN OF CARE
Goal Outcome Evaluation:  Plan of Care Reviewed With: patient  Plan of Care Reviewed With: patient  Patient Agreement with Plan of Care: agrees     Progress: improving   Patient withdrawn but cooperative, reports poor sleep last night, fatigue, denies suicidal or homicidal ideation

## 2025-07-30 NOTE — PAYOR COMM NOTE
"Elmer Pinto (35 y.o. Female)       Date of Birth   1990    Social Security Number       Address   [address] [id number]    Home Phone       MRN   [id number]       Holiness   None    Marital Status   Single                            Admission Date   7/30/2025    Admission Type   Emergency    Admitting Provider   Everett Larson MD    Attending Provider   Che Narvaez MD    Department, Room/Bed   [facility] ADULT PSYCHIATRIC, 1018/2S       Discharge Date       Discharge Disposition       Discharge Destination                                 Attending Provider: Che Narvaez MD    Allergies: Macrobid [Nitrofurantoin], Norco [Hydrocodone-acetaminophen]    Isolation: None   Infection: None   Code Status: CPR    Ht: 160 cm (63\")   Wt: 66.7 kg (147 lb)    Admission Cmt: None   Principal Problem: Suicidal behavior [R45.89]                   Active Insurance as of 7/30/2025       Primary Coverage       Payor Plan Insurance Group Employer/Plan Group    [employer] [place]ICA[place]        Payor Plan Address Payor Plan Phone Number Payor Plan Fax Number Effective Dates    [address] [address] [phone]  3/5/2021 - None Entered    [facility] [id number]         Subscriber Name Subscriber Birth Date Member ID       ELMER PINTO 1990 [id number]                     Emergency Contacts        (Rel.) Home Phone Work Phone Mobile Phone    TANYA SILVA (Friend) -- -- [phone]                              PLEASE ATTACH THIS CLINICAL TO REFERENCE # [id number]             NEW/INITIAL INPATIENT BEHAVIORAL HEALTH (PSYCHIATRIC) AUTHORIZATION REQUEST (REV CODE 0124)  ADMISSION DATE:  07/30/2025  Elmer Pinto   Adm Date: 7/30/2025 Adm Time: 2:56 AM Admit Dx: Suicidal behavior [R45.89]   ADMITTED FROM THE EMERGENCY DEPARTMENT  FACILITY: [facility] (NPI [id number]) (TAX ID [id number])  ADDRESS: [address] [place] [address]  ATTENDING MD: CHE NARVAEZ " "([facility] [id number])  ADDRESS: [address] [place], [place] [address]  PRIMARY DIAGNOSIS PER H&P:   Bipolar affective disorder, depressed (F31.30)    ESTIMATED LENGTH OF STAY: 5-7 DAYS    SUBMITTED BY: KHUSHI COHEN RN  FOLLOW UP/ONGOING U.R. CONTACT:   ROSAURA WHALENEfrain  PHONE [phone]  [phone]                              Che Milan MD   Physician  Psychiatry     H&P     Signed     Date of Service: 25 1002  Creation Time: 25 1002     Signed       Expand All Collapse All            INITIAL PSYCHIATRIC HISTORY & PHYSICAL     Patient Identification:  Name:  Qing Greer  Age:  35 y.o.  Sex:  female  :  1990  MRN:  [id number]   Visit Number:  [id number]  Primary Care Physician:  Anna Marie Rojas PA     SUBJECTIVE     CC/Focus of Exam: SI, depression     HPI: Qing Greer is a 35 y.o. female who was admitted on 2025 with complaints of feeling down and hopeless that she wanted to end her life. She states she has been dealing with depression for a long time and has had episodes of severe depression in the past but not this severe. She states she started feeling really bad this time about two days ago. She states stress makes it worse and nothing makes it better. When asked what was stressing her out, she replied \"what is not stressing me out\". When asked about her current top three stressors, she replied, \"making sure bills are paid, making sure kids are fed, making sure everything is still going in her life\". She admitted she is experiencing financial and emotional difficulties. She states everybody in her life is causing her emotional pain, as she feels she has been let down a lot. When asked to elaborate, she described her  committed suicide in front of her in 2017 and blamed her for all of his problems, she added her mother let her down when she was helping take care of patient's kids and was secretly taking methadone and patient's adopted daughter's baby got hold of a methadone and " all kids were taken away by [employer]. The baby was okay. The patient has the kids back but now her mother is sick and patient has to take care of her and taking care of everyone is too overwhelming.      PAST PSYCHIATRIC HX: Patient reports she has been treated for bipolar, PTSD, depression and anxiety. Has been in outpatient treatment since 2016 but no inpatient treatment. Has been tried on multiple medications including Vraylar to lithium. She reports she has manic episodes where she spends too much money, sleeps very little, has lots of energy, racing thoughts, talk fast, more goal directed activity but not able to accomplish much.      SUBSTANCE USE HX: Patient reports a history of tobacco and thc use. She smokes half ppd of cigarettes and 2-3 joints of thc daily. Last use was yesterday.      SOCIAL HX:   Social History   Social History            Socioeconomic History    Marital status: Single    Number of children: 3    Highest education level: Some college, no degree   Tobacco Use    Smoking status: Every Day       Current packs/day: 0.50       Average packs/day: 0.5 packs/day for 8.6 years (4.3 ttl pk-yrs)       Types: Cigarettes       Start date: 2017    Smokeless tobacco: Never   Vaping Use    Vaping status: Every Day    Substances: Nicotine, THC, CBD, Flavoring    Devices: Disposable   Substance and Sexual Activity    Alcohol use: Not Currently       Alcohol/week: 1.0 standard drink of alcohol       Types: 1 Glasses of wine per week    Drug use: Never    Sexual activity: Defer       Partners: Male               Medical History        Past Medical History:   Diagnosis Date    Abnormal Pap smear of cervix 2012    Acid reflux      Anxiety      Bipolar disorder      Depression      PTSD (post-traumatic stress disorder)      STD (sexually transmitted disease) 2011     Chlamydia    Urinary incontinence Last 3 months    Urinary tract infection       Alot throughout my life               Surgical History         Past  "Surgical History:   Procedure Laterality Date    APPENDECTOMY   2018    LAPAROSCOPIC CHOLECYSTECTOMY        SKIN BIOPSY         \"pre-cancerous\" moles removed    TONSILLECTOMY         and adenoids                  Family History   Problem Relation Age of Onset    Depression Mother      Anxiety disorder Mother      Arrhythmia Mother      Hypertension Mother      Suicide Attempts Father      Heart attack Maternal Grandmother      Arrhythmia Maternal Grandmother      Hypertension Maternal Grandmother              Prescriptions Prior to Admission           Medications Prior to Admission   Medication Sig Dispense Refill Last Dose/Taking    Cariprazine HCl (VRAYLAR) 4.5 MG capsule capsule Take 1 capsule by mouth Every Night.     Past Week Bedtime    Cyanocobalamin 5000 MCG tablet dispersible Place 1 tablet on the tongue Every Night.     Past Week Bedtime    esomeprazole (nexIUM) 40 MG capsule Take 1 capsule by mouth Every Night.     Past Week Bedtime    famotidine (PEPCID) 40 MG tablet Take 1 tablet by mouth Every Night.     Past Week Bedtime    loratadine (CLARITIN) 10 MG tablet Take 1 tablet by mouth Every Night.     Past Week Bedtime    prazosin (MINIPRESS) 1 MG capsule Take 1 capsule by mouth Every Night.     Past Week Bedtime    prazosin (MINIPRESS) 2 MG capsule Take 1 capsule by mouth Every Night.     Past Week Bedtime    propranolol LA (INDERAL LA) 120 MG 24 hr capsule Take 1 capsule by mouth Every Night.     Past Week Bedtime    zolpidem (AMBIEN) 5 MG tablet Take 1 tablet by mouth At Night As Needed for Sleep.     Past Week Bedtime    dicyclomine (BENTYL) 10 MG capsule Take 1 capsule by mouth Every 6 (Six) Hours As Needed for Abdominal Cramping.     Unknown               ALLERGIES:  Macrobid [nitrofurantoin] and Norco [hydrocodone-acetaminophen]     Temp:  [97.2 °F (36.2 °C)-97.8 °F (36.6 °C)] 97.2 °F (36.2 °C)  Heart Rate:  [64-88] 78  Resp:  [16-18] 18  BP: ()/(54-84) 98/54     REVIEW OF SYSTEMS:  Review of " Systems   Constitutional: Negative.    HENT: Negative.     Eyes: Negative.    Respiratory: Negative.     Cardiovascular: Negative.    Gastrointestinal:  Positive for abdominal pain and nausea.   Endocrine: Negative.    Genitourinary: Negative.    Musculoskeletal: Negative.    Allergic/Immunologic: Negative.    Neurological: Negative.    Hematological: Negative.    Psychiatric/Behavioral:  Positive for dysphoric mood. The patient is nervous/anxious.          OBJECTIVE    PHYSICAL EXAM:  Physical Exam  Constitutional:  Appears well-developed and well-nourished.   HENT:   Head: Normocephalic and atraumatic.   Right Ear: External ear normal.   Left Ear: External ear normal.   Mouth/Throat: Oropharynx is clear and moist.   Eyes: Pupils are equal, round, and reactive to light. Conjunctivae and EOM are normal.   Neck: Normal range of motion. Neck supple.   Cardiovascular: Normal rate, regular rhythm and normal heart sounds.    Respiratory: Effort normal and breath sounds normal. No respiratory distress. No wheezes.   GI: Soft. Bowel sounds are normal.No distension. There is no tenderness.   Musculoskeletal: Normal range of motion. No edema or deformity.   Neurological:  Cranial Nerves: I. No anosmia. II: No visual disturbance. III, IV VI: EOMI, PERRLA. V: Corneal reflext intact, no abnormal sensations. VII: No facial palsy, or altered sensation. VIII: Hearing intact, balance intact. IX: Intact ah reflex. X: Normal phonation, swallowing. XI: Normal shrug and head movement. XII: Intact tongue movements  Coordination normal. No lateralizing signs.  Skin: Skin is warm and dry. No rash noted. No erythema.      MENTAL STATUS EXAM:   Hygiene:   fair  Cooperation:  Cooperative  Eye Contact:  Fair  Psychomotor Behavior:  Slow  Affect:  Restricted  Hopelessness: 4  Speech:  Normal  Thought Progress: Goal directed  Thought Content:  Normal  Suicidal:  Suicidal Ideation  Homicidal:  None  Hallucinations:  None  Delusion:   None  Memory:  Intact  Orientation:  Person, Place, Time, and Situation  Reliability:  fair  Insight:  Fair  Judgement:  Fair  Impulse Control:  Fair     Imaging Results (Last 24 Hours)         ** No results found for the last 24 hours. **                ECG/EMG Results (most recent)         None                      Lab Results   Component Value Date     GLUCOSE 89 07/30/2025     BUN 7.7 07/30/2025     CREATININE 0.64 07/30/2025     BCR 12.0 07/30/2025     CO2 25.0 07/30/2025     CALCIUM 8.9 07/30/2025     ALBUMIN 4.6 07/30/2025     AST 11 07/30/2025     ALT 12 07/30/2025               Lab Results   Component Value Date     WBC 16.23 (H) 07/30/2025     HGB 14.3 07/30/2025     HCT 43.3 07/30/2025     MCV 89.3 07/30/2025      07/30/2025         Last Urine Toxicity  More data may exist              Latest Ref Rng & Units 7/29/2025 8/3/2014   LAST URINE TOXICITY RESULTS   Amphetamine, Urine Qual Negative Negative  Negative    Barbiturates Screen, Urine Negative Negative  Negative    Benzodiazepine Screen, Urine Negative Negative  Negative    Buprenorphine, Screen, Urine Negative Negative  -   Cocaine Screen, Urine Negative Negative  Negative    Fentanyl, Urine Negative Negative  -   Methadone Screen , Urine Negative Negative  Negative    Methamphetamine, Ur Negative Negative  -            Brief Urine Lab Results  (Last result in the past 365 days)          Color   Clarity   Blood   Leuk Est   Nitrite   Protein   CREAT   Urine HCG         07/29/25 2347 Yellow    Clear    Negative    Negative    Negative    30 mg/dL (1+)                             DATA  Labs reviewed. Potassium 3.4, total bilirubin 1.5. WBC 16.23. Hep screen non-reactive. UDS positive for thc.   EKG reviewed. QTc 436 ms  GOSIA reviewed.   Record reviewed. No previous treatment noted in this hospital for mental health or substance use problems.         Strengths: Motivated for treatment     Weaknesses:Substance use and Poor coping skills     Code  "status:  Full  Discussed code status with patient.     ASSESSMENT & PLAN:     Hospital bed: No       Suicidal ideations  -SP 3       Bipolar affective disorder, depressed  -Taper off cariprazine. 3 mg nightly x1 day, 1.5 mg nightly x1 day then stop  -Start aripiprazole 5 mg po daily       PTSD (post-traumatic stress disorder)  -Continue prazosin       Tetrahydrocannabinol (THC) use disorder, moderate, dependence  -Supportive treatment  -Encouraged cessation       Nicotine use disorder  -Nicotine replacement  -Encouraged cessation        The patient has been admitted for safety and stabilization.  Patient will be monitored for suicidality daily and maintained on Special Precautions Level 3 (q15 min checks) .  The patient will have individual and group therapy with a master's level therapist. A master treatment plan will be developed and agreed upon by the patient and his/her treatment team.  The patient's estimated length of stay in the hospital is 5-7 days.                                 Triage HPI    Flowsheet Row Most Recent Value   Stated Reason for Visit \"I've been really manic lately and feeling hopeless\"   History Obtained From patient     Acuity/Destination    Row Name 07/29/25 8878     Acuity/Destination   Patient Acuity 2     ED Destination Behavioral health                    Rosario Mendosa, APRN   Nurse Practitioner  Emergency Medicine     ED Provider Notes      Cosign Needed     Date of Service: 07/30/25 0241  Creation Time: 07/30/25 0241   Related encounter: ED from 7/29/2025 in [facility] EMERGENCY DEPARTMENT with Vance Edwards DO     Cosign Needed       Expand All Collapse All    Subjective  History of Present Illness  Patient is a 35-year-old female presents today requesting a psychiatric evaluation.  She reports that the last 2 days she is been more depressed.  She reports that last night and today she is felt \"very hopeless.\"  She reports \"I just want to end it.  I do not " "want to live anymore.\"  She reports that she has been having thoughts of wanting to end her life but denies active plan.  She denies homicidal ideation or any hallucinations.  Denies drug or alcohol abuse.  Denies other complaints.           Review of Systems   Respiratory: Negative.     Cardiovascular: Negative.    Gastrointestinal: Negative.    Genitourinary: Negative.    Psychiatric/Behavioral:  Positive for suicidal ideas.          Medical History        Past Medical History:   Diagnosis Date    Abnormal Pap smear of cervix 2012    Acid reflux      Anxiety      Bipolar disorder      Depression      PTSD (post-traumatic stress disorder)      STD (sexually transmitted disease) 2011     Chlamydia    Urinary incontinence Last 3 months    Urinary tract infection       Alot throughout my life            Allergies        Allergies   Allergen Reactions    Macrobid [Nitrofurantoin] Itching    Norco [Hydrocodone-Acetaminophen] Hives and Itching            Surgical History         Past Surgical History:   Procedure Laterality Date    APPENDECTOMY   2018    LAPAROSCOPIC CHOLECYSTECTOMY        SKIN BIOPSY         \"pre-cancerous\" moles removed    TONSILLECTOMY         and adenoids                  Family History   Problem Relation Age of Onset    Depression Mother      Anxiety disorder Mother      Arrhythmia Mother      Hypertension Mother      Suicide Attempts Father      Heart attack Maternal Grandmother      Arrhythmia Maternal Grandmother      Hypertension Maternal Grandmother           Social History   Social History            Socioeconomic History    Marital status: Single    Number of children: 3    Highest education level: Some college, no degree   Tobacco Use    Smoking status: Every Day       Current packs/day: 0.50       Average packs/day: 0.5 packs/day for 8.6 years (4.3 ttl pk-yrs)       Types: Cigarettes       Start date: 2017    Smokeless tobacco: Never   Vaping Use    Vaping status: Every Day    Substances: " "Nicotine, THC, CBD, Flavoring    Devices: Disposable   Substance and Sexual Activity    Alcohol use: Not Currently       Alcohol/week: 1.0 standard drink of alcohol       Types: 1 Glasses of wine per week    Drug use: Never    Sexual activity: Defer       Partners: Male                  Objective  Physical Exam  Cardiovascular:      Rate and Rhythm: Normal rate and regular rhythm.      Pulses: Normal pulses.   Pulmonary:      Effort: Pulmonary effort is normal.   Neurological:      Mental Status: She is alert.   Psychiatric:         Attention and Perception: Attention normal.         Mood and Affect: Mood is depressed.         Thought Content: Thought content includes suicidal ideation. Thought content does not include suicidal plan.            Procedures              ED Course      ED Course as of 07/30/25 0244   Wed Jul 30, 2025   0205 [facility] for further evaluation per on-call psychiatrist. [KH]   0230 EKG interpretation normal sinus rhythm 66 bpm QTc is 436 no ST elevations or depressions.  Electronically signed by Vance Edwards DO, 07/30/25, 2:30 AM EDT.   [GF]       ED Course User Index  [GF] Vance Edwards DO  [KH] Rosario Mendosa, APRN                                                      Medical Decision Making  Patient is a 35-year-old female presents today requesting a psychiatric evaluation.  She reports that the last 2 days she is been more depressed.  She reports that last night and today she is felt \"very hopeless.\"  She reports \"I just want to end it.  I do not want to live anymore.\"  She reports that she has been having thoughts of wanting to end her life but denies active plan.  She denies homicidal ideation or any hallucinations.  Denies drug or alcohol abuse.  Denies other complaints.     Problems Addressed:  Suicidal ideation: complicated acute illness or injury     Amount and/or Complexity of Data Reviewed  Labs: ordered.  ECG/medicine tests: " ordered.           Final diagnoses:   Suicidal ideation         ED Disposition  ED Disposition         ED Disposition   DC/Transfer to Behavioral Health    Condition   Stable    Comment   --                   No follow-up provider specified.         Medication List       No changes were made to your prescriptions during this visit.               NavyaRosario, APRN  07/30/25 0244                           Girish Parker, RN   Registered Nurse  Nursing     Nursing Note     Signed     Date of Service: 07/30/25 0125  Creation Time: 07/30/25 0125   Related encounter: ED from 7/29/2025 in [facility] EMERGENCY DEPARTMENT with Vance Edwards DO     Signed         Patient presents to intake with suicidal ideation. Patient states that she has felt manic and hopeless lately and today she started having suicidal thoughts. Patient states that she doesn't have a plan or method just the suicidal thoughts. Patient states that she goes to therapy and takes medication for mental health and seen her therapist yesterday. Patient has cried through out assessment and [facility] through assessment patient refused to answer any further questions and repetitively states that she just wants to go home and doesn't want to be here.       Patient denies HI and any AVH     Patient refuses to rate her depression and anxiety.      Patient denies any alcohol or drug use with the exception of marijuana. Patient ethanol is negative. Patient toxicology screen is positive for THC.      Patient has a history of anxiety, bipolar disorder, depression and PTSD.      Patient abnormal labs  Potassium 3.2  Total bilirubin 1.7  WBC 15.79                        Qing Greer #[id number] (CSN:[id number]) (35 y.o. F) (Adm: 07/30/25)  [facility] [id number]  Obstetric History    No obstetric history on file.  Medical History    Past Medical History    Diagnosis Date Comments   Abnormal Pap smear of cervix [R87.619] 2012    STD (sexually  "transmitted disease) [A64]  Chlamydia   Urinary incontinence [R32] Last 3 months    Urinary tract infection [N39.0]  Alot throughout my life   Anxiety [F41.9]     Depression [F32.A]     PTSD (post-traumatic stress disorder) [F43.10]     Bipolar disorder [F31.9]     Acid reflux [K21.9]     Pertinent Negatives    Diagnosis Date Noted Comments   HIV disease [B20] 2023    Pyelonephritis [N12] 2023    Cancer [C80.1] 2023    Hormone disorder [E34.9] 2023    Renal cancer [C64.9] 2023    Clotting disorder [D68.9] 2023    Hypertension [I10] 2023    Coronary artery disease [I25.10] 2023    Interstitial cystitis [N30.10] 2023    Testicular cancer [C62.90] 2023    Diabetes mellitus [E11.9] 2023    Chronic kidney disease [N18.9] 2023    Undescended testicle [Q53.9] 2023    Elevated PSA [R97.20] 2023    Kidney stone [N20.0] 2023    Ovarian cancer [C56.9] 2023    Uterine cancer [C55] 2023    Epididymitis [N45.1] 2023    Polycystic kidney disease [Q61.3] 2023    Prostate cancer [C61] 2023    Vaginal infection [N76.0] 2023    Fibroid [D21.9] 2023    Prostatitis [N41.9] 2023    Hard to intubate [T88.4XXA] 2023    PONV (postoperative nausea and vomiting) [R11.2, Z98.890] 2023    Spinal headache [G97.1] 2023    Malignant hyperthermia due to anesthesia [T88.3XXA] 2023      Surgical History    Past Surgical History     Laterality Date Comments   Appendectomy [SHX54]  2018    Laparoscopic cholecystectomy [RAJ335]      Tonsillectomy [NJP4259]   and adenoids   Skin biopsy [SHX1]   \"pre-cancerous\" moles removed      Pertinent Negatives     Date Comments   HYDROCELE EXCISION / REPAIR [XQT2386] 2023    OOPHORECTOMY [SHX86] 2023    BLADDER SURGERY [HJV469] 2023    HYSTERECTOMY [SHX81] 2023     SECTION [ELD7909] 2023    NEPHRECTOMY [SHX65] " 02/03/2023    ORCHIECTOMY [YFJ3729] 02/03/2023    COLON SURGERY [MHZ061] 02/03/2023    KIDNEY STONE SURGERY [SDX513] 02/03/2023    TUBAL ABDOMINAL LIGATION [SHX77] 02/03/2023    CYSTOSCOPY [CEN199] 02/03/2023    NEPHRECTOMY TRANSPLANTED ORGAN [WON476] 02/03/2023    VARICOCELE EXCISION [PPF618] 02/03/2023    HERNIA REPAIR [SHX51] 02/03/2023    LITHOTRIPSY [YEM365] 02/03/2023      Social History    Tobacco History    Smoking Status  Every Day Smoking Start Date  2017 Current Packs/Day  0.5 packs/day Average Packs/Day  0.5 packs/day for 8.6 years (4.3 ttl pk-yrs) Smoking Tobacco Type  Cigarettes since 2017   Pack Year History  Packs/Day From To Years   0.5 2017  8.6   Smokeless Tobacco Use  Never   Alcohol History    Alcohol Use Status  Not Currently Drinks/Week  1 Glasses of wine, 0 Cans of beer, 0 Shots of liquor, 0 Drinks containing 0.5 oz of alcohol per week Amount  1.0 standard drink of alcohol/wk   Drug Use    Drug Use Status  Never   Sexual Activity    Sexually Active  Defer   Other Factors    Not Asked    E-cigarette/Vaping    Questions Responses   E-cigarette/Vaping Use Current Every Day User      E-cigarette/Vaping Substances    Questions Responses   Nicotine Yes   THC Yes   CBD Yes   Flavoring Yes      E-cigarette/Vaping Devices    Questions Responses   Disposable Yes   Pre-filled or Refillable Cartridge No   Refillable Tank No   Pre-filled Pod No     Family History  Pedigree Problem Relation Age of Onset Comments   Anxiety disorder Mother     Arrhythmia Maternal Grandmother     Arrhythmia Mother     Depression Mother     Heart attack Maternal Grandmother     Hypertension Maternal Grandmother     Hypertension Mother     Suicide Attempts Father       Family Status    Relation Status   Mother    PPI   Father    Maternal Grandmother    PPI, Ablat     E-cigarette/Vaping    Questions Responses   E-cigarette/Vaping Use Current Every Day User      E-cigarette/Vaping Substances    Questions Responses   Nicotine Yes    THC Yes   CBD Yes   Flavoring Yes      E-cigarette/Vaping Devices    Questions Responses   Disposable Yes   Pre-filled or Refillable Cartridge No   Refillable Tank No   Pre-filled Pod No            Ethanol    Status: Final result        Component  Ref Range & Units 07/29/25 2338   Ethanol  0 - 10 mg/dL <10   Ethanol %  % <0.010   Resulting Agency  [facility]   Narrative    Not for legal purposes.        Specimen Collected: 07/29/25 2338 Last Resulted: 07/30/25 0001 View Other Order Details                    Contains abnormal data Urine Drug Screen - Urine, Clean Catch    Status: Final result        Component  Ref Range & Units 07/29/25 2347   THC, Screen, Urine  Negative Positive Abnormal    Phencyclidine (PCP), Urine  Negative Negative   Cocaine Screen, Urine  Negative Negative   Methamphetamine, Ur  Negative Negative   Opiate Screen  Negative Negative   Amphetamine Screen, Urine  Negative Negative   Benzodiazepine Screen, Urine  Negative Negative   Tricyclic Antidepressants Screen  Negative Negative   Methadone Screen, Urine  Negative Negative   Barbiturates Screen, Urine  Negative Negative   Oxycodone Screen, Urine  Negative Negative   Buprenorphine, Screen, Urine  Negative Negative   Resulting Agency  [facility]   Narrative    Cutoff For Drugs Screened:    Amphetamines               500 ng/ml  Barbiturates               200 ng/ml  Benzodiazepines            150 ng/ml  Cocaine                    150 ng/ml  Methadone                  200 ng/ml  Opiates                    100 ng/ml  Phencyclidine               25 ng/ml  THC                         50 ng/ml  Methamphetamine            500 ng/ml  Tricyclic Antidepressants  300 ng/ml  Oxycodone                  100 ng/ml  Buprenorphine               10 ng/ml    The normal value for all drugs tested is negative. This report includes unconfirmed screening results, with the cutoff values listed, to be used for medical treatment purposes only.  Unconfirmed results must  not be used for non-medical purposes such as employment or legal testing.  Clinical consideration should be applied to any drug of abuse test, particularly when unconfirmed results are used.          Specimen Collected: 07/29/25 2347 Last Resulted: 07/30/25 0002 View Other Order Details                [facility] EMERGENCY DEPARTMENT  [facility] ADULT PSYCHIATRIC    07/29 0701 - 07/30 0700 07/30    Time:  2348 2349 2351 0310 0343 0421 0647 0806        Vitals    Temp  97.8 (36.6)   97.2 (36.2)    97.2 (36.2)  Temp     Temp src  Tympanic   Temporal    Temporal  Temp src     Heart Rate  88   65  64 72 78  Heart Rate     Heart Rate Source  Monitor   Monitor  Monitor Monitor Monitor  Heart Rate Source     Resp  18   16    18  Resp     Resp Rate Source  Visual   Visual    Visual  Resp Rate Source     BP  118/76   128/84  124/64 105/60 98/54  BP     BP Location  Left arm   Right arm  Right arm Left arm Right arm  BP Location     BP Method  Automatic   Automatic  Automatic Automatic Automatic  BP Method     Patient Position  Sitting   Sitting  Lying Lying Lying  Patient Position     Weight   59 kg (130 lb)  66.7 kg (147 ...      Weight                  Girish Parker, RN   Registered Nurse  Nursing     Nursing Note     Signed     Date of Service: 07/30/25 0140  Creation Time: 07/30/25 0150   Related encounter: ED from 7/29/2025 in [facility] EMERGENCY DEPARTMENT with Vance Edwards DO     Signed         Spoke to Dr. Larson about patient labs and plan of care for patient and Dr. Larson advises to admit patient routine orders SP3 with repeat CBC and CMP in the morning and if patient isn't voluntary to place a hold on patient. RBOX2. I have advised the ED provider of Dr. Larson's recommendation as well.      Pharmacy and lead RN Es notified of patient admission. Patient has been assigned to bed 18B     EKG ordered @ 0154

## 2025-07-30 NOTE — NURSING NOTE
Spoke to Dr. Larson about patient labs and plan of care for patient and Dr. Larson advises to admit patient routine orders SP3 with repeat CBC and CMP in the morning and if patient isn't voluntary to place a hold on patient. RBOX2. I have advised the ED provider of Dr. Larson's recommendation as well.     Pharmacy and lead RN Es notified of patient admission. Patient has been assigned to bed 18B    EKG ordered @ 0154

## 2025-07-30 NOTE — NURSING NOTE
Spoke with patient about allergies and she advises that she takes acetaminophen and does not have an allergy to it.

## 2025-07-30 NOTE — H&P
"      INITIAL PSYCHIATRIC HISTORY & PHYSICAL    Patient Identification:  Name:  Qing Greer  Age:  35 y.o.  Sex:  female  :  1990  MRN:  [id number]   Visit Number:  [id number]  Primary Care Physician:  Anna Marie Rojas PA    SUBJECTIVE    CC/Focus of Exam: SI, depression    HPI: Qing Greer is a 35 y.o. female who was admitted on 2025 with complaints of feeling down and hopeless that she wanted to end her life. She states she has been dealing with depression for a long time and has had episodes of severe depression in the past but not this severe. She states she started feeling really bad this time about two days ago. She states stress makes it worse and nothing makes it better. When asked what was stressing her out, she replied \"what is not stressing me out\". When asked about her current top three stressors, she replied, \"making sure bills are paid, making sure kids are fed, making sure everything is still going in her life\". She admitted she is experiencing financial and emotional difficulties. She states everybody in her life is causing her emotional pain, as she feels she has been let down a lot. When asked to elaborate, she described her  committed suicide in front of her in 2017 and blamed her for all of his problems, she added her mother let her down when she was helping take care of patient's kids and was secretly taking methadone and patient's adopted daughter's baby got hold of a methadone and all kids were taken away by [employer]. The baby was okay. The patient has the kids back but now her mother is sick and patient has to take care of her and taking care of everyone is too overwhelming.     PAST PSYCHIATRIC HX: Patient reports she has been treated for bipolar, PTSD, depression and anxiety. Has been in outpatient treatment since 2016 but no inpatient treatment. Has been tried on multiple medications including Vraylar to lithium. She reports she has manic episodes where she spends too " "much money, sleeps very little, has lots of energy, racing thoughts, talk fast, more goal directed activity but not able to accomplish much.     SUBSTANCE USE HX: Patient reports a history of tobacco and thc use. She smokes half ppd of cigarettes and 2-3 joints of thc daily. Last use was yesterday.     SOCIAL HX:   Social History     Socioeconomic History    Marital status: Single    Number of children: 3    Highest education level: Some college, no degree   Tobacco Use    Smoking status: Every Day     Current packs/day: 0.50     Average packs/day: 0.5 packs/day for 8.6 years (4.3 ttl pk-yrs)     Types: Cigarettes     Start date: 2017    Smokeless tobacco: Never   Vaping Use    Vaping status: Every Day    Substances: Nicotine, THC, CBD, Flavoring    Devices: Disposable   Substance and Sexual Activity    Alcohol use: Not Currently     Alcohol/week: 1.0 standard drink of alcohol     Types: 1 Glasses of wine per week    Drug use: Never    Sexual activity: Defer     Partners: Male         Past Medical History:   Diagnosis Date    Abnormal Pap smear of cervix 2012    Acid reflux     Anxiety     Bipolar disorder     Depression     PTSD (post-traumatic stress disorder)     STD (sexually transmitted disease) 2011    Chlamydia    Urinary incontinence Last 3 months    Urinary tract infection     Alot throughout my life          Past Surgical History:   Procedure Laterality Date    APPENDECTOMY  2018    LAPAROSCOPIC CHOLECYSTECTOMY      SKIN BIOPSY      \"pre-cancerous\" moles removed    TONSILLECTOMY      and adenoids       Family History   Problem Relation Age of Onset    Depression Mother     Anxiety disorder Mother     Arrhythmia Mother     Hypertension Mother     Suicide Attempts Father     Heart attack Maternal Grandmother     Arrhythmia Maternal Grandmother     Hypertension Maternal Grandmother          Medications Prior to Admission   Medication Sig Dispense Refill Last Dose/Taking    Cariprazine HCl (VRAYLAR) 4.5 MG " capsule capsule Take 1 capsule by mouth Every Night.   Past Week Bedtime    Cyanocobalamin 5000 MCG tablet dispersible Place 1 tablet on the tongue Every Night.   Past Week Bedtime    esomeprazole (nexIUM) 40 MG capsule Take 1 capsule by mouth Every Night.   Past Week Bedtime    famotidine (PEPCID) 40 MG tablet Take 1 tablet by mouth Every Night.   Past Week Bedtime    loratadine (CLARITIN) 10 MG tablet Take 1 tablet by mouth Every Night.   Past Week Bedtime    prazosin (MINIPRESS) 1 MG capsule Take 1 capsule by mouth Every Night.   Past Week Bedtime    prazosin (MINIPRESS) 2 MG capsule Take 1 capsule by mouth Every Night.   Past Week Bedtime    propranolol LA (INDERAL LA) 120 MG 24 hr capsule Take 1 capsule by mouth Every Night.   Past Week Bedtime    zolpidem (AMBIEN) 5 MG tablet Take 1 tablet by mouth At Night As Needed for Sleep.   Past Week Bedtime    dicyclomine (BENTYL) 10 MG capsule Take 1 capsule by mouth Every 6 (Six) Hours As Needed for Abdominal Cramping.   Unknown         ALLERGIES:  Macrobid [nitrofurantoin] and Norco [hydrocodone-acetaminophen]    Temp:  [97.2 °F (36.2 °C)-97.8 °F (36.6 °C)] 97.2 °F (36.2 °C)  Heart Rate:  [64-88] 78  Resp:  [16-18] 18  BP: ()/(54-84) 98/54    REVIEW OF SYSTEMS:  Review of Systems   Constitutional: Negative.    HENT: Negative.     Eyes: Negative.    Respiratory: Negative.     Cardiovascular: Negative.    Gastrointestinal:  Positive for abdominal pain and nausea.   Endocrine: Negative.    Genitourinary: Negative.    Musculoskeletal: Negative.    Allergic/Immunologic: Negative.    Neurological: Negative.    Hematological: Negative.    Psychiatric/Behavioral:  Positive for dysphoric mood. The patient is nervous/anxious.         OBJECTIVE    PHYSICAL EXAM:  Physical Exam  Constitutional:  Appears well-developed and well-nourished.   HENT:   Head: Normocephalic and atraumatic.   Right Ear: External ear normal.   Left Ear: External ear normal.   Mouth/Throat:  Oropharynx is clear and moist.   Eyes: Pupils are equal, round, and reactive to light. Conjunctivae and EOM are normal.   Neck: Normal range of motion. Neck supple.   Cardiovascular: Normal rate, regular rhythm and normal heart sounds.    Respiratory: Effort normal and breath sounds normal. No respiratory distress. No wheezes.   GI: Soft. Bowel sounds are normal.No distension. There is no tenderness.   Musculoskeletal: Normal range of motion. No edema or deformity.   Neurological:  Cranial Nerves: I. No anosmia. II: No visual disturbance. III, IV VI: EOMI, PERRLA. V: Corneal reflext intact, no abnormal sensations. VII: No facial palsy, or altered sensation. VIII: Hearing intact, balance intact. IX: Intact ah reflex. X: Normal phonation, swallowing. XI: Normal shrug and head movement. XII: Intact tongue movements  Coordination normal. No lateralizing signs.  Skin: Skin is warm and dry. No rash noted. No erythema.     MENTAL STATUS EXAM:   Hygiene:   fair  Cooperation:  Cooperative  Eye Contact:  Fair  Psychomotor Behavior:  Slow  Affect:  Restricted  Hopelessness: 4  Speech:  Normal  Thought Progress: Goal directed  Thought Content:  Normal  Suicidal:  Suicidal Ideation  Homicidal:  None  Hallucinations:  None  Delusion:  None  Memory:  Intact  Orientation:  Person, Place, Time, and Situation  Reliability:  fair  Insight:  Fair  Judgement:  Fair  Impulse Control:  Fair    Imaging Results (Last 24 Hours)       ** No results found for the last 24 hours. **             ECG/EMG Results (most recent)       None             Lab Results   Component Value Date    GLUCOSE 89 07/30/2025    BUN 7.7 07/30/2025    CREATININE 0.64 07/30/2025    BCR 12.0 07/30/2025    CO2 25.0 07/30/2025    CALCIUM 8.9 07/30/2025    ALBUMIN 4.6 07/30/2025    AST 11 07/30/2025    ALT 12 07/30/2025       Lab Results   Component Value Date    WBC 16.23 (H) 07/30/2025    HGB 14.3 07/30/2025    HCT 43.3 07/30/2025    MCV 89.3 07/30/2025      07/30/2025       Last Urine Toxicity  More data may exist         Latest Ref Rng & Units 7/29/2025 8/3/2014   LAST URINE TOXICITY RESULTS   Amphetamine, Urine Qual Negative Negative  Negative    Barbiturates Screen, Urine Negative Negative  Negative    Benzodiazepine Screen, Urine Negative Negative  Negative    Buprenorphine, Screen, Urine Negative Negative  -   Cocaine Screen, Urine Negative Negative  Negative    Fentanyl, Urine Negative Negative  -   Methadone Screen , Urine Negative Negative  Negative    Methamphetamine, Ur Negative Negative  -       Brief Urine Lab Results  (Last result in the past 365 days)        Color   Clarity   Blood   Leuk Est   Nitrite   Protein   CREAT   Urine HCG        07/29/25 2347 Yellow   Clear   Negative   Negative   Negative   30 mg/dL (1+)                   DATA  Labs reviewed. Potassium 3.4, total bilirubin 1.5. WBC 16.23. Hep screen non-reactive. UDS positive for thc.   EKG reviewed. QTc 436 ms  GOSIA reviewed.   Record reviewed. No previous treatment noted in this hospital for mental health or substance use problems.       Strengths: Motivated for treatment    Weaknesses:Substance use and Poor coping skills    Code status:  Full  Discussed code status with patient.    ASSESSMENT & PLAN:    Hospital bed: No      Suicidal ideations  -SP 3      Bipolar affective disorder, depressed  -Taper off cariprazine. 3 mg nightly x1 day, 1.5 mg nightly x1 day then stop  -Start aripiprazole 5 mg po daily      PTSD (post-traumatic stress disorder)  -Continue prazosin      Tetrahydrocannabinol (THC) use disorder, moderate, dependence  -Supportive treatment  -Encouraged cessation      Nicotine use disorder  -Nicotine replacement  -Encouraged cessation      The patient has been admitted for safety and stabilization.  Patient will be monitored for suicidality daily and maintained on Special Precautions Level 3 (q15 min checks) .  The patient will have individual and group therapy with a master's  level therapist. A master treatment plan will be developed and agreed upon by the patient and his/her treatment team.  The patient's estimated length of stay in the hospital is 5-7 days.

## 2025-07-30 NOTE — NURSING NOTE
Pt noted to have an allergy to Norco (Hydrocodone-acetaminophen). Pt prescribed acetaminophen in admission order set. Pt states she takes acetaminophen with no allergic/adverse reaction. Dr. DONNELL Larson notified of pt allergy, gave order to discontinue acetaminophen 650 mg tablet as precaution. TRBOX2.

## 2025-07-30 NOTE — NURSING NOTE
Patient presents to intake with suicidal ideation. Patient states that she has felt manic and hopeless lately and today she started having suicidal thoughts. Patient states that she doesn't have a plan or method just the suicidal thoughts. Patient states that she goes to therapy and takes medication for mental health and seen her therapist yesterday. Patient has cried through out assessment and longterm through assessment patient refused to answer any further questions and repetitively states that she just wants to go home and doesn't want to be here.      Patient denies HI and any AVH    Patient refuses to rate her depression and anxiety.     Patient denies any alcohol or drug use with the exception of marijuana. Patient ethanol is negative. Patient toxicology screen is positive for THC.     Patient has a history of anxiety, bipolar disorder, depression and PTSD.     Patient abnormal labs  Potassium 3.2  Total bilirubin 1.7  WBC 15.79

## 2025-07-30 NOTE — NURSING NOTE
While searching pt belongings, a THC vape pen was found. Security was notified. Jake Erwin and Keanu erwin left unit at 0427 with contraband to dispose of per hospital policy.

## 2025-07-31 VITALS
BODY MASS INDEX: 26.05 KG/M2 | HEIGHT: 63 IN | DIASTOLIC BLOOD PRESSURE: 73 MMHG | TEMPERATURE: 97.8 F | OXYGEN SATURATION: 97 % | HEART RATE: 84 BPM | WEIGHT: 147 LBS | SYSTOLIC BLOOD PRESSURE: 112 MMHG | RESPIRATION RATE: 18 BRPM

## 2025-07-31 PROCEDURE — 99239 HOSP IP/OBS DSCHRG MGMT >30: CPT | Performed by: PSYCHIATRY & NEUROLOGY

## 2025-07-31 RX ORDER — ARIPIPRAZOLE 5 MG/1
5 TABLET ORAL DAILY
Qty: 30 TABLET | Refills: 0 | Status: SHIPPED | OUTPATIENT
Start: 2025-08-01

## 2025-07-31 RX ADMIN — DICYCLOMINE HYDROCHLORIDE 10 MG: 10 CAPSULE ORAL at 09:38

## 2025-07-31 RX ADMIN — NICOTINE 1 PATCH: 21 PATCH, EXTENDED RELEASE TRANSDERMAL at 04:10

## 2025-07-31 RX ADMIN — CARIPRAZINE 1.5 MG: 1.5 CAPSULE, GELATIN COATED ORAL at 12:47

## 2025-07-31 RX ADMIN — ARIPIPRAZOLE 5 MG: 10 TABLET ORAL at 08:32

## 2025-07-31 NOTE — DISCHARGE SUMMARY
":  1990  MRN:  [id number]  Visit Number:  [id number]      Date of Admission:2025   Date of Discharge:  2025    Discharge Diagnosis:  Principal Problem:    Suicidal ideations  Active Problems:    Bipolar affective disorder, depressed    PTSD (post-traumatic stress disorder)    Tetrahydrocannabinol (THC) use disorder, moderate, dependence    Nicotine use disorder        Admission Diagnosis:  Suicidal behavior [R45.89]     SALOMON Greer is a 35 y.o. female who was admitted on 2025 with complaints of feeling down and hopeless that she wanted to end her life.    For details please see H&P dated 25.    Hospital Course  Patient is a 35 y.o. female presented with depression and SI. The patient was admitted to the [facility] adult psych unit for safety, further evaluation and treatment.  The patient reported her medications were no helping any more. She agreed to taper off cariprazine and aripiprazole as started. She was able to make the switch without any adverse effects.  The patient was also able to take part in individual and group counseling sessions and work on appropriate coping skills.  The patient made rapid improvement in her mood and expressed feeling more positive and hopeful about future. Sleep and appetite were improved.  The day of discharge the patient was calm, cooperative and pleasant. Mood was reported to be good, and denied SI/HI/AVH. Also reported no medication side effects.        Mental Status Exam upon discharge:   Mood \"good\"   Affect-congruent, appropriate, stable  Thought Content-goal directed, no delusional material present  Thought process-linear, organized.  Suicidality: No SI  Homicidality: No HI  Perception: No /    Procedures Performed         Consults:   Consults       No orders found for last 30 day(s).            Pertinent Test Results:   Admission on 2025   Component Date Value Ref Range Status    Glucose 2025 89  65 - 99 mg/dL Final "    BUN 07/30/2025 7.7  6.0 - 20.0 mg/dL Final    Creatinine 07/30/2025 0.64  0.57 - 1.00 mg/dL Final    Sodium 07/30/2025 141  136 - 145 mmol/L Final    Potassium 07/30/2025 3.4 (L)  3.5 - 5.2 mmol/L Final    Chloride 07/30/2025 102  98 - 107 mmol/L Final    CO2 07/30/2025 25.0  22.0 - 29.0 mmol/L Final    Calcium 07/30/2025 8.9  8.6 - 10.5 mg/dL Final    Total Protein 07/30/2025 6.7  6.0 - 8.5 g/dL Final    Albumin 07/30/2025 4.6  3.5 - 5.2 g/dL Final    ALT (SGPT) 07/30/2025 12  1 - 33 U/L Final    AST (SGOT) 07/30/2025 11  1 - 32 U/L Final    Alkaline Phosphatase 07/30/2025 68  39 - 117 U/L Final    Total Bilirubin 07/30/2025 1.5 (H)  0.0 - 1.2 mg/dL Final    Globulin 07/30/2025 2.1  gm/dL Final    A/G Ratio 07/30/2025 2.2  g/dL Final    BUN/Creatinine Ratio 07/30/2025 12.0  7.0 - 25.0 Final    Anion Gap 07/30/2025 14.0  5.0 - 15.0 mmol/L Final    eGFR 07/30/2025 117.6  >60.0 mL/min/1.73 Final    WBC 07/30/2025 16.23 (H)  3.40 - 10.80 10*3/mm3 Final    RBC 07/30/2025 4.85  3.77 - 5.28 10*6/mm3 Final    Hemoglobin 07/30/2025 14.3  12.0 - 15.9 g/dL Final    Hematocrit 07/30/2025 43.3  34.0 - 46.6 % Final    MCV 07/30/2025 89.3  79.0 - 97.0 fL Final    MCH 07/30/2025 29.5  26.6 - 33.0 pg Final    MCHC 07/30/2025 33.0  31.5 - 35.7 g/dL Final    RDW 07/30/2025 13.4  12.3 - 15.4 % Final    RDW-SD 07/30/2025 43.8  37.0 - 54.0 fl Final    MPV 07/30/2025 9.7  6.0 - 12.0 fL Final    Platelets 07/30/2025 257  140 - 450 10*3/mm3 Final    Neutrophil % 07/30/2025 69.1  42.7 - 76.0 % Final    Lymphocyte % 07/30/2025 21.9  19.6 - 45.3 % Final    Monocyte % 07/30/2025 6.3  5.0 - 12.0 % Final    Eosinophil % 07/30/2025 0.9  0.3 - 6.2 % Final    Basophil % 07/30/2025 0.5  0.0 - 1.5 % Final    Immature Grans % 07/30/2025 1.3 (H)  0.0 - 0.5 % Final    Neutrophils, Absolute 07/30/2025 11.20 (H)  1.70 - 7.00 10*3/mm3 Final    Lymphocytes, Absolute 07/30/2025 3.56 (H)  0.70 - 3.10 10*3/mm3 Final    Monocytes, Absolute 07/30/2025 1.03  (H)  0.10 - 0.90 10*3/mm3 Final    Eosinophils, Absolute 07/30/2025 0.15  0.00 - 0.40 10*3/mm3 Final    Basophils, Absolute 07/30/2025 0.08  0.00 - 0.20 10*3/mm3 Final    Immature Grans, Absolute 07/30/2025 0.21 (H)  0.00 - 0.05 10*3/mm3 Final    nRBC 07/30/2025 0.0  0.0 - 0.2 /100 WBC Final    Hepatitis B Surface Ag 07/30/2025 Non-Reactive  Non-Reactive Final    Hep A IgM 07/30/2025 Non-Reactive  Non-Reactive Final    Hep B C IgM 07/30/2025 Non-Reactive  Non-Reactive Final    Hepatitis C Ab 07/30/2025 Non-Reactive  Non-Reactive Final   Admission on 07/29/2025, Discharged on 07/30/2025   Component Date Value Ref Range Status    Glucose 07/29/2025 98  65 - 99 mg/dL Final    BUN 07/29/2025 7.6  6.0 - 20.0 mg/dL Final    Creatinine 07/29/2025 0.67  0.57 - 1.00 mg/dL Final    Sodium 07/29/2025 137  136 - 145 mmol/L Final    Potassium 07/29/2025 3.2 (L)  3.5 - 5.2 mmol/L Final    Chloride 07/29/2025 101  98 - 107 mmol/L Final    CO2 07/29/2025 22.0  22.0 - 29.0 mmol/L Final    Calcium 07/29/2025 8.6  8.6 - 10.5 mg/dL Final    Total Protein 07/29/2025 6.8  6.0 - 8.5 g/dL Final    Albumin 07/29/2025 4.4  3.5 - 5.2 g/dL Final    ALT (SGPT) 07/29/2025 11  1 - 33 U/L Final    AST (SGOT) 07/29/2025 14  1 - 32 U/L Final    Alkaline Phosphatase 07/29/2025 67  39 - 117 U/L Final    Total Bilirubin 07/29/2025 1.7 (H)  0.0 - 1.2 mg/dL Final    Globulin 07/29/2025 2.4  gm/dL Final    A/G Ratio 07/29/2025 1.8  g/dL Final    BUN/Creatinine Ratio 07/29/2025 11.3  7.0 - 25.0 Final    Anion Gap 07/29/2025 14.0  5.0 - 15.0 mmol/L Final    eGFR 07/29/2025 116.3  >60.0 mL/min/1.73 Final    Color, UA 07/29/2025 Yellow  Yellow, Straw Final    Appearance, UA 07/29/2025 Clear  Clear Final    pH, UA 07/29/2025 6.0  5.0 - 8.0 Final    Specific Gravity, UA 07/29/2025 >=1.030  1.005 - 1.030 Final    Glucose, UA 07/29/2025 Negative  Negative Final    Ketones, UA 07/29/2025 Negative  Negative Final    Bilirubin, UA 07/29/2025 Negative  Negative Final     Blood, UA 07/29/2025 Negative  Negative Final    Protein, UA 07/29/2025 30 mg/dL (1+) (A)  Negative Final    Leuk Esterase, UA 07/29/2025 Negative  Negative Final    Nitrite, UA 07/29/2025 Negative  Negative Final    Urobilinogen, UA 07/29/2025 0.2 E.U./dL  0.2 - 1.0 E.U./dL Final    HCG Qualitative 07/29/2025 Negative  Negative Final    THC, Screen, Urine 07/29/2025 Positive (A)  Negative Final    Phencyclidine (PCP), Urine 07/29/2025 Negative  Negative Final    Cocaine Screen, Urine 07/29/2025 Negative  Negative Final    Methamphetamine, Ur 07/29/2025 Negative  Negative Final    Opiate Screen 07/29/2025 Negative  Negative Final    Amphetamine Screen, Urine 07/29/2025 Negative  Negative Final    Benzodiazepine Screen, Urine 07/29/2025 Negative  Negative Final    Tricyclic Antidepressants Screen 07/29/2025 Negative  Negative Final    Methadone Screen, Urine 07/29/2025 Negative  Negative Final    Barbiturates Screen, Urine 07/29/2025 Negative  Negative Final    Oxycodone Screen, Urine 07/29/2025 Negative  Negative Final    Buprenorphine, Screen, Urine 07/29/2025 Negative  Negative Final    Ethanol 07/29/2025 <10  0 - 10 mg/dL Final    Ethanol % 07/29/2025 <0.010  % Final    Magnesium 07/29/2025 2.1  1.6 - 2.6 mg/dL Final    WBC 07/29/2025 15.79 (H)  3.40 - 10.80 10*3/mm3 Final    RBC 07/29/2025 4.84  3.77 - 5.28 10*6/mm3 Final    Hemoglobin 07/29/2025 14.5  12.0 - 15.9 g/dL Final    Hematocrit 07/29/2025 42.8  34.0 - 46.6 % Final    MCV 07/29/2025 88.4  79.0 - 97.0 fL Final    MCH 07/29/2025 30.0  26.6 - 33.0 pg Final    MCHC 07/29/2025 33.9  31.5 - 35.7 g/dL Final    RDW 07/29/2025 13.3  12.3 - 15.4 % Final    RDW-SD 07/29/2025 43.3  37.0 - 54.0 fl Final    MPV 07/29/2025 9.5  6.0 - 12.0 fL Final    Platelets 07/29/2025 273  140 - 450 10*3/mm3 Final    Neutrophil % 07/29/2025 65.0  42.7 - 76.0 % Final    Lymphocyte % 07/29/2025 26.0  19.6 - 45.3 % Final    Monocyte % 07/29/2025 5.0  5.0 - 12.0 % Final     Eosinophil % 2025 2.0  0.3 - 6.2 % Final    Bands %  2025 1.0  0.0 - 5.0 % Final    Metamyelocyte % 2025 1.0 (H)  0.0 - 0.0 % Final    Neutrophils Absolute 2025 10.42 (H)  1.70 - 7.00 10*3/mm3 Final    Lymphocytes Absolute 2025 4.11 (H)  0.70 - 3.10 10*3/mm3 Final    Monocytes Absolute 2025 0.79  0.10 - 0.90 10*3/mm3 Final    Eosinophils Absolute 2025 0.32  0.00 - 0.40 10*3/mm3 Final    RBC Morphology 2025 Normal  Normal Final    Platelet Morphology 2025 Normal  Normal Final    Fentanyl, Urine 2025 Negative  Negative Final    RBC, UA 2025 None Seen  None Seen, 0-2 /HPF Final    WBC, UA 2025 0-2  None Seen, 0-2 /HPF Final    Bacteria, UA 2025 None Seen  None Seen /HPF Final    Squamous Epithelial Cells, UA 2025 0-2  None Seen, 0-2 /HPF Final    Yeast, UA 2025 Small/1+ Yeast (A)  None Seen /HPF Final    Hyaline Casts, UA 2025 None Seen  None Seen /LPF Final    Methodology 2025 Automated Microscopy   Final    QT Interval 2025 416  ms Final    QTC Interval 2025 436  ms Final   Lab on 07/10/2025   Component Date Value Ref Range Status    Reference Lab Report 07/10/2025    Final                    Value:[facility]  [address] [place] [address]  Phone: [phone] or [phone]  Fax: [phone]  Jose Carlos Bowers M.D., Medical Director    PATIENT NAME                                 LABORATORY NO.  [id number]   ELMER PINTO                             [id number]  [id number]  [facility]                          AGE                SEX     SSN            CLIENT REF #  [id number]       1990   F       [id number]    [id number]  [address] [place] [address]                               REQUESTING M.D.       ATTENDING M.D..        COPY TO..  SEAN CLARKE    DATE COLLECTED        DATE RECEIVED          DATE REPORTED  07/10/2025            2025              07/14/2025    DIAGNOSIS:  PERIPHERAL SMEAR  Leukocytosis with mild neutrophilia and lymphocytosis. No blast identified.  Normocytic, normochromic RBC's without anemia.  Adequate platelets.      CLINICAL HISTORY:  Leukocytosis, unspecified type    CLINICAL LABORATORY DATA  WBC                                  12.9 x10/3/-L    RDW         13.1%  HGB        14.6 g/dl        PLT         236 x10/3/-L  HCT        44.7%            LYMPHS      33.1%  MCV        87.5fL           NEUTS       58.1%  MONO        6.4%  EOS         1.2%  BASO        0.5%    PERIPHERAL SMEAR MICROSCOPIC DESCRIPTION:  Walsh stained smears are reviewed microscopically. See diagnosis for details.    Professional interpretation rendered by Broderick Aguiar M.D., ALVA.C.A.PEfrain at [facility], [address] [place] [address].    GROSS DESCRIPTION:  RECEIVED 1 STAINED AND 2 UNSTAINED SLIDES - MG 7/11/2025    REVIEWED, DIAGNOSED AND ELECTRONICALLY  SIGNED BY:    Broderick Aguiar M.D., F.C.A.P.  CPT CODES:        12166          Condition on Discharge:  improved    Vital Signs  Temp:  [97.8 °F (36.6 °C)-98.3 °F (36.8 °C)] 97.8 °F (36.6 °C)  Heart Rate:  [78-84] 84  Resp:  [16-18] 18  BP: ()/(58-81) 112/73      Discharge Disposition:  Home or Self Care    Discharge Medications:     Discharge Medications        New Medications        Instructions Start Date   ARIPiprazole 5 MG tablet  Commonly known as: ABILIFY   5 mg, Oral, Daily   Start Date: August 1, 2025            Continue These Medications        Instructions Start Date   Cyanocobalamin 5000 MCG tablet dispersible   1 tablet, Nightly      dicyclomine 10 MG capsule  Commonly known as: BENTYL   10 mg, Oral, Every 6 Hours PRN      esomeprazole 40 MG capsule  Commonly known as: nexIUM   40 mg, Nightly      famotidine 40 MG tablet  Commonly known as: PEPCID   40 mg, Nightly      loratadine 10 MG tablet  Commonly known as: CLARITIN   10 mg, Nightly      prazosin 2 MG capsule  Commonly known as:  MINIPRESS   2 mg, Nightly      prazosin 1 MG capsule  Commonly known as: MINIPRESS   1 mg, Nightly      propranolol  MG 24 hr capsule  Commonly known as: INDERAL LA   120 mg, Nightly      zolpidem 5 MG tablet  Commonly known as: AMBIEN   5 mg, Nightly PRN             Stop These Medications      Cariprazine HCl 4.5 MG capsule capsule  Commonly known as: VRAYLAR              Discharge Diet: Regular     Activity at Discharge: As tolerated     [place] [address]  [phone]      Time: I spent  > 30  minutes on this discharge activity which included: face-to-face encounter with the patient, reviewing the data in the system, coordination of the care with the nursing staff as well as consultants, documentation, and entering orders.        Clinician:   Che Milan MD  07/31/25  12:01 EDT

## 2025-07-31 NOTE — DISCHARGE INSTR - APPOINTMENTS
August 25th @ 8 am (Earliest available appointment, patient placed on a call list to be seen if something comes available.)     [facility]  [address] [place], [place] [address] [phone]

## 2025-07-31 NOTE — PROGRESS NOTES
Behavioral Health Discharge Summary             Please fax within 24 hours of discharge to [facility] at: [phone]      Member Name: Qing Greer Member ID: [id number]   Authorization Number: Have not received auth # yet Phone: [phone]   Member Address: [address] [place] [address]   Discharge Date: 07/31/2025 Level of Care at Discharge:    Facility: [facility] Staff Completing Form: Melody COHEN   If the member is being discharged directly to a residential or extended care program, please specify the type below.   __Private Child-Caring Facility (PCC) Residential/Group Home   __Private Child-Caring Facility (PCC) Therapeutic Foster Care   __Residential Treatment Facility (RTF)   __Psychiatric Residential Treatment Facility (PRTF I or II)   __Long-Term Acute Inpatient Hospital Services or Extended Care Unit (ECU)   __Other (please specify):    Brief discharge summary of treatment received (for follow up by the case management team): D/C clinical with list of medications and follow up appts given to patient upon discharge.     BRIEF SUMMARY OF RECOMMENDATIONS FOR ONGOING TREATMENT     Discharged to where: Home   Discharge diagnoses: F 31.30   Axis I:    Axis II:    Axis III:    Axis IV:    Axis V:    Does the member understand his/her DX?  Yes          Medication     Dose     Schedule Supply/  Quantity  Given at Discharge RX Provided  Yes/No  If Rx Provided, Quantity RX Prior Auth Required  Yes/No Prior Auth  Completed                                                                                             Does the member understand the reason for taking these medications? Yes                                                           FOLLOW-UP APPOINTMENTS   Please schedule within 7 days of discharge and provide appointment details for all referred services.    PCP/Other Providers Involved in Treatment:    Appointment Type: RIYA KAUFFMAN   Provider Name: [facility]  Provider Phone:   [phone] Appointment Date: 08/25/2025  Earliest available Appointment Time:   8:00 AM     Assessment   (new to OP services)        Case Management    Is the member already enrolled in case management?  Yes/No  If yes, date the CM was notified:    If no, was the CM referral offered?  Yes/No  Accepted? Yes/No    Is the Release of Information in the chart? Yes/No:      Medication Management (for member discharged with psychiatric medications):      A&D Treatment (for member with substance abuse/   dependence in the past year):      Medical Condition (for member with a medical condition):    Other recommended treatment:    Do you have any concerns about the discharge plan?  No    If yes, explain:    Was the member involved in the discharge planning?  Yes    If no, explain:    Was a copy of the discharge plan provided to the member?  Yes    If no, explain:

## 2025-07-31 NOTE — NURSING NOTE
Patient discharged and escorted off the unit with all personal belongings, medications, and discharge instructions. Patient denies SI/HI/AVH at the time of discharge.

## 2025-07-31 NOTE — PROGRESS NOTES
Patient to discharge on this date. Patient reports that she is feeling better and no longer is experiencing heightened levels of anxiety and depression. Patient reports feeling optimistic about handling her stressors better as she returns home. Patient states that her mother fell last night and broke her collarbone and she is wanting to go home and help care for her. Patient convincingly denies SI/HI/AVH.     Nahid Galvez Safety Plan completed with patient.     Safety planning completed with patient's boyfriend, Robert, who reports he has already cleared the house of firearms, sharp objects, and medications to prepare for her return home. Robert reports excitement at the patient's return home and states that he will provide transportation for her.     Patient to follow up with [facility].

## 2025-07-31 NOTE — PROGRESS NOTES
"INPATIENT PSYCHIATRIC PROGRESS NOTE    Name:  Qing Greer  :  1990  MRN:  [id number]  Visit Number:  [id number]  Length of stay:  1    SUBJECTIVE    CC/Focus of Exam: bipolar depression    INTERVAL HISTORY:  The patient reports she is feeling some better, the new medication was started today and she reports no adverse effects.  Depression rating 4/10  Anxiety rating 4/10  Sleep: good  Withdrawal sx: None  Cravin/10    Review of Systems   Constitutional: Negative.    Respiratory: Negative.     Cardiovascular: Negative.    Gastrointestinal: Negative.        OBJECTIVE    Temp:  [97.8 °F (36.6 °C)-98.3 °F (36.8 °C)] 97.8 °F (36.6 °C)  Heart Rate:  [78-84] 84  Resp:  [16-18] 18  BP: ()/(58-81) 112/73    MENTAL STATUS EXAM:  Appearance:Casually dressed, good hygeine.   Cooperation:Cooperative  Psychomotor: No psychomotor agitation/retardation, No EPS, No motor tics  Speech-normal rate, amount.  Mood \"better\"   Affect-congruent, appropriate, stable  Thought Content-goal directed, no delusional material present  Thought process-linear, organized.  Suicidality: No SI  Homicidality: No HI  Perception: No AH/VH  Insight-fair   Judgement-fair    Lab Results (last 24 hours)       ** No results found for the last 24 hours. **               Imaging Results (Last 24 Hours)       ** No results found for the last 24 hours. **               ECG/EMG Results (most recent)       None             ALLERGIES: Macrobid [nitrofurantoin] and Norco [hydrocodone-acetaminophen]    Medication Review:   Scheduled Medications:  ARIPiprazole, 5 mg, Oral, Daily  Cariprazine HCl, 1.5 mg, Oral, Nightly  cetirizine, 10 mg, Oral, Nightly  famotidine, 40 mg, Oral, Nightly  nicotine, 1 patch, Transdermal, Q24H  pantoprazole, 40 mg, Oral, Nightly  prazosin, 1 mg, Oral, Nightly  prazosin, 2 mg, Oral, Nightly  propranolol LA, 120 mg, Oral, Nightly  vitamin B-12, 2,000 mcg, Oral, Nightly         PRN Medications:    aluminum-magnesium " hydroxide-simethicone    benzonatate    benztropine **OR** benztropine    dicyclomine    famotidine    hydrOXYzine    ibuprofen    loperamide    magnesium hydroxide    ondansetron ODT    polyethylene glycol    sodium chloride    traZODone    zolpidem   All medications reviewed.    ASSESSMENT & PLAN:      Suicidal ideations  -SP 3       Bipolar affective disorder, depressed  -Taper off cariprazine. 3 mg nightly x1 day, 1.5 mg nightly x1 day then stop  -Started aripiprazole 5 mg po daily.       PTSD (post-traumatic stress disorder)  -Continue prazosin       Tetrahydrocannabinol (THC) use disorder, moderate, dependence  -Supportive treatment  -Encouraged cessation       Nicotine use disorder  -Nicotine replacement  -Encouraged cessation    Special precautions: Special Precautions Level 3 (q15 min checks) .    Behavioral Health Treatment Plan and Problem List: I have reviewed and approved the Behavioral Health Treatment Plan and Problem list.  The patient has had a chance to review and agrees with the treatment plan.    Copied text in portions of this note has been reviewed and is accurate as of 07/31/25         Clinician:  Che Milan MD  07/31/25  10:31 EDT

## 2025-07-31 NOTE — PLAN OF CARE
Goal Outcome Evaluation:  Plan of Care Reviewed With: patient  Patient Agreement with Plan of Care: agrees        Outcome Evaluation: Denied SI/HI/AVH. Calm and cooperative with staff.  Appropriate with peers. Anxiety 8/10 depression 8/10.

## 2025-07-31 NOTE — PLAN OF CARE
Goal Outcome Evaluation:  Plan of Care Reviewed With: patient  Plan of Care Reviewed With: patient  Patient Agreement with Plan of Care: agrees     Progress: improving  Outcome Evaluation: Patient calm and cooperative. Rates anxiety and depression both a 4. Denies SI/HI/AVH. Patient is being discharged home today.

## 2025-08-14 ENCOUNTER — LAB (OUTPATIENT)
Dept: LAB | Facility: HOSPITAL | Age: 35
End: 2025-08-14
Payer: COMMERCIAL

## 2025-08-14 ENCOUNTER — TRANSCRIBE ORDERS (OUTPATIENT)
Dept: ADMINISTRATIVE | Facility: HOSPITAL | Age: 35
End: 2025-08-14
Payer: COMMERCIAL

## 2025-08-14 DIAGNOSIS — D72.829 LEUKOCYTOSIS, UNSPECIFIED TYPE: Primary | ICD-10-CM

## 2025-08-14 DIAGNOSIS — D72.829 LEUKOCYTOSIS, UNSPECIFIED TYPE: ICD-10-CM

## 2025-08-14 LAB
BASOPHILS # BLD AUTO: 0.08 10*3/MM3 (ref 0–0.2)
BASOPHILS NFR BLD AUTO: 0.8 % (ref 0–1.5)
DEPRECATED RDW RBC AUTO: 44.3 FL (ref 37–54)
EOSINOPHIL # BLD AUTO: 0.16 10*3/MM3 (ref 0–0.4)
EOSINOPHIL NFR BLD AUTO: 1.5 % (ref 0.3–6.2)
ERYTHROCYTE [DISTWIDTH] IN BLOOD BY AUTOMATED COUNT: 13.3 % (ref 12.3–15.4)
ERYTHROCYTE [SEDIMENTATION RATE] IN BLOOD: <1 MM/HR (ref 0–20)
HCT VFR BLD AUTO: 40.7 % (ref 34–46.6)
HGB BLD-MCNC: 13.4 G/DL (ref 12–15.9)
IMM GRANULOCYTES # BLD AUTO: 0.06 10*3/MM3 (ref 0–0.05)
IMM GRANULOCYTES NFR BLD AUTO: 0.6 % (ref 0–0.5)
LYMPHOCYTES # BLD AUTO: 2.86 10*3/MM3 (ref 0.7–3.1)
LYMPHOCYTES NFR BLD AUTO: 27.1 % (ref 19.6–45.3)
MCH RBC QN AUTO: 30.1 PG (ref 26.6–33)
MCHC RBC AUTO-ENTMCNC: 32.9 G/DL (ref 31.5–35.7)
MCV RBC AUTO: 91.5 FL (ref 79–97)
MONOCYTES # BLD AUTO: 0.6 10*3/MM3 (ref 0.1–0.9)
MONOCYTES NFR BLD AUTO: 5.7 % (ref 5–12)
NEUTROPHILS NFR BLD AUTO: 6.78 10*3/MM3 (ref 1.7–7)
NEUTROPHILS NFR BLD AUTO: 64.3 % (ref 42.7–76)
NRBC BLD AUTO-RTO: 0 /100 WBC (ref 0–0.2)
PATHOLOGY REVIEW: YES
PLATELET # BLD AUTO: 249 10*3/MM3 (ref 140–450)
PMV BLD AUTO: 10.2 FL (ref 6–12)
RBC # BLD AUTO: 4.45 10*6/MM3 (ref 3.77–5.28)
WBC NRBC COR # BLD AUTO: 10.54 10*3/MM3 (ref 3.4–10.8)

## 2025-08-14 PROCEDURE — 85025 COMPLETE CBC W/AUTO DIFF WBC: CPT

## 2025-08-14 PROCEDURE — 36415 COLL VENOUS BLD VENIPUNCTURE: CPT

## 2025-08-14 PROCEDURE — 85652 RBC SED RATE AUTOMATED: CPT

## 2025-08-15 LAB
LAB AP CASE REPORT: NORMAL
PATH REPORT.FINAL DX SPEC: NORMAL